# Patient Record
Sex: FEMALE | Race: WHITE | Employment: FULL TIME | URBAN - METROPOLITAN AREA
[De-identification: names, ages, dates, MRNs, and addresses within clinical notes are randomized per-mention and may not be internally consistent; named-entity substitution may affect disease eponyms.]

---

## 2019-03-20 ENCOUNTER — ANESTHESIA EVENT (OUTPATIENT)
Dept: SURGERY | Age: 49
End: 2019-03-20
Payer: COMMERCIAL

## 2019-03-20 NOTE — H&P
PATIENT: Kodak Gallegos YOB: 1970 DATE: 03/15/2019 10:30 AM  
VISIT TYPE: Consult HISTORIAN: self 
_____________________________________________________________ Completed Orders (this encounter) Order Interpretation Result Next Lab Date Dietary management education, guidance, and counseling Assessment/Plan # Detail Type Description 1. Assessment Chronic cholecystitis (K81.1). Impression discussed lap shabana, she needs c-scope and possible EGD, she would like to wait on those and proceed with lap shabana first.  
 Patient Plan lap shabana 2. Assessment Body mass index (BMI) 22.0-22.9, adult (J29.33). Plan Orders Today's instructions / counseling include(s) Dietary management education, guidance, and counseling. This 54year old female presents for Abdominal Discomfort. History of Present Illness: 1. Abdominal Discomfort Onset: gradual.  Severity is 5. Duration is varies. It occurs daily. The problem is worse. Location is RUQ. There is radiation to back and down abdomen. The patient describes it as aching and colicky. Context: after meals. Symptom is aggravated by pressure to abdomen, constipation and food. Denies relieving factors. Additional information: Does not have reptilian pets. Does not have well water. Has recently traveled out of the country. blood in her stool, US neg, HIDA nl EF, reproduction of sx, no jaundice, no f/c, acid blockers did not help. PROBLEM LIST:   Problem List reviewed. Problem Description Onset Date Chronic Clinical Status Notes Muscle, ligament and fascia disorders 2014 N Low back pain 2014 N Disorder of sacrum 2014 N Enthesopathy of hip region 2014 N Hip pain 2014 N    
 
 
 
PAST MEDICAL/SURGICAL HISTORY  (Detailed) Disease/disorder Onset Date Management Date Comments  section Family History  (Detailed) Patient adopted - no family history known. Social History:  (Detailed) Tobacco use reviewed. Preferred language is [place]. EDUCATION/EMPLOYMENT/OCCUPATION Employment History Status Retired Restrictions  
  MARITAL STATUS/FAMILY/SOCIAL SUPPORT Currently . Smoking status: Never smoker. SMOKING STATUS Use Status Type Smoking Status Usage Per Day Years Used Total Pack Years  
no/never  Never smoker TOBACCO/VAPING EXPOSURE No passive smoke exposure. ALCOHOL There is a history of alcohol use. Type: Wine. 1 glass consumed weekly. CAFFEINE The patient uses caffeine: coffee/tea - 3 cups a day. Medications (active prior to today) Medication Instructions Start Date Stop Date Refilled Elsewhere Mircette (28) 0.15 mg-0.02 mg(21)/0.01 mg(5) tablet take 1 tablet by oral route  every day 11/13/2014   N  
AMITRIPTYLINE HCL 25 MG TAB take 1 tablet by mouth at bedtime 06/20/2018 06/20/2018 N Patient Status Completed with information received for patient transitioning into care. Medication Reconciliation Medications reconciled today. Medication Reviewed Adherence Medication Name Sig Desc Elsewhere Status  
taking as directed Mircette (28) 0.15 mg-0.02 mg(21)/0.01 mg(5) tablet take 1 tablet by oral route  every day N Verified  
taking as directed AMITRIPTYLINE HCL 25 MG TAB take 1 tablet by mouth at bedtime N Verified Allergies: 
Ingredient Reaction (Severity) Medication Name Comment ISONIAZID Reviewed, no changes. Review of Systems System Neg/Pos Details Constitutional Negative Fatigue, Fever and Night sweats. ENMT Negative Ear drainage, Hearing loss and Nasal drainage. Eyes Negative Eye discharge, Vision changes and Vision loss. Respiratory Negative Cough, Dyspnea and Wheezing. Cardio Negative Chest pain, Claudication and Irregular heartbeat/palpitations. GI Positive Abdominal pain, Rectal bleeding. GI Negative Abdominal mass, Constipation, Diarrhea, Jaundice, Nausea and Vomiting. GI Comments No c-scope yet, no FH of colon cancer.  Negative Dysuria, Hematuria and Polyuria (Genitourinary). Endocrine Negative Cold intolerance, Heat intolerance, Polydipsia, Polyphagia and Polyuria (Endocrine). Neuro Negative Gait disturbance. Psych Negative Anxiety and Depression. Integumentary Negative Pruritus and Rash. MS Negative Joint swelling and Muscle weakness. Hema/Lymph Negative Easy bleeding and Easy bruising. Allergic/Immuno Negative Environmental allergies and Food allergies. Reproductive Negative Dysmenorrhea, Irregular menses, Menorrhagia and Vaginal discharge. Vital Signs Height Time ft in cm Last Measured Height Position 10:36 AM 5.0 7.50 171.45 03/15/2019 Standing Weight/BSA/BMI Time lb oz kg Context BMI kg/m2 BSA m2  
10:36 .20  65.408 dressed with shoes 22.25 Blood Pressure Time BP mm/Hg Position Side Site Method Cuff Size 10:36 /80 sitting right arm manual adult Temperature/Pulse/Respiration Time Temp F Temp C Temp Site Pulse/min Pattern Resp/ min 10:36 AM    80 regular 12 Pulse Oximetry/FIO2 Time Pulse Ox (Rest %) Pulse Ox (Amb %) O2 Sat O2 L/Min Timing FiO2 % L/min Delivery Method Finger Probe 10:36 AM 98  RA Pain Scale Time Pain Score Method 10:36 AM 8/10 Numeric Pain Intensity Scale Measured By Time Measured by  
10:36 AM Candance Heal Physical Exam: 
Exam Findings Details Constitutional Normal No acute distress. Well nourished. Well developed. Eyes Normal General - Right: Normal, Left: Normal. Sclera - Right: Normal, Left: Normal.  
Neck Exam Normal Inspection - Normal.  
Respiratory Normal Cough - Absent. Effort - Normal.  
Cardiovascular Normal Inspection - JVD: Absent. Heart rate - Regular rate. Rhythm - Regular. Abdomen * Abdominal tenderness - RUQ. Abdomen Normal Patient is not obese. No hepatic enlargement. No spleen enlargement. No hernia. No ascites. No palpable mass. Skin * Inspection - General inspection: warm and dry, anicteric. Extremity Normal No Cyanosis. No Edema. Neurological Normal Level of consciousness - Normal. Orientation - Normal. Memory - Normal.  
Psychiatric Normal Orientation - Oriented to time, place, person & situation. No agitation. Not anxious. Appropriate mood and affect. Medications (added, continued, or stopped this visit): 
Start Date Medication Directions PRN Status PRN Reason Instruction Stop Date  
06/20/2018 AMITRIPTYLINE HCL 25 MG TAB take 1 tablet by mouth at bedtime N     
11/13/2014 Mircette (28) 0.15 mg-0.02 mg(21)/0.01 mg(5) tablet take 1 tablet by oral route  every day N Active Patient Care Team Members Name Contact Agency Type Support Role Relationship [facility] Date [facility]   [facility] provider Sussy Cespedes   Patient provider PCP   [facility] Provider: Chico Hinton 03/18/2019 7:14 AM  
Document generated by:  Young Gomes 03/18/2019 07:13 AM

## 2019-03-21 ENCOUNTER — ANESTHESIA (OUTPATIENT)
Dept: SURGERY | Age: 49
End: 2019-03-21
Payer: COMMERCIAL

## 2019-03-21 ENCOUNTER — APPOINTMENT (OUTPATIENT)
Dept: GENERAL RADIOLOGY | Age: 49
End: 2019-03-21
Attending: SURGERY
Payer: COMMERCIAL

## 2019-03-21 ENCOUNTER — HOSPITAL ENCOUNTER (OUTPATIENT)
Age: 49
Setting detail: OUTPATIENT SURGERY
Discharge: HOME | End: 2019-03-21
Attending: SURGERY | Admitting: SURGERY
Payer: COMMERCIAL

## 2019-03-21 VITALS
SYSTOLIC BLOOD PRESSURE: 137 MMHG | WEIGHT: 147.56 LBS | TEMPERATURE: 97.3 F | HEART RATE: 74 BPM | BODY MASS INDEX: 23.16 KG/M2 | DIASTOLIC BLOOD PRESSURE: 75 MMHG | OXYGEN SATURATION: 100 % | HEIGHT: 67 IN | RESPIRATION RATE: 16 BRPM

## 2019-03-21 DIAGNOSIS — K81.9 CHOLECYSTITIS: Primary | ICD-10-CM

## 2019-03-21 LAB
HCG UR QL: NEGATIVE
HCT VFR BLD AUTO: 35.8 % (ref 35–45)
HGB BLD-MCNC: 12.1 G/DL (ref 12–16)

## 2019-03-21 PROCEDURE — 77030020782 HC GWN BAIR PAWS FLX 3M -B: Performed by: SURGERY

## 2019-03-21 PROCEDURE — 77030008602 HC TRCR ENDOSC EPATH J&J -B: Performed by: SURGERY

## 2019-03-21 PROCEDURE — 81025 URINE PREGNANCY TEST: CPT

## 2019-03-21 PROCEDURE — 77030018836 HC SOL IRR NACL ICUM -A: Performed by: SURGERY

## 2019-03-21 PROCEDURE — 74011000250 HC RX REV CODE- 250: Performed by: SURGERY

## 2019-03-21 PROCEDURE — 76010000138 HC OR TIME 0.5 TO 1 HR: Performed by: SURGERY

## 2019-03-21 PROCEDURE — 77030003580 HC NDL INSUF VERES J&J -B: Performed by: SURGERY

## 2019-03-21 PROCEDURE — 76060000032 HC ANESTHESIA 0.5 TO 1 HR: Performed by: SURGERY

## 2019-03-21 PROCEDURE — 88304 TISSUE EXAM BY PATHOLOGIST: CPT

## 2019-03-21 PROCEDURE — 77030008603 HC TRCR ENDOSC EPATH J&J -C: Performed by: SURGERY

## 2019-03-21 PROCEDURE — 77030008683 HC TU ET CUF COVD -A: Performed by: ANESTHESIOLOGY

## 2019-03-21 PROCEDURE — 74011636320 HC RX REV CODE- 636/320: Performed by: SURGERY

## 2019-03-21 PROCEDURE — 77030008518 HC TBNG INSUF ENDO STRY -B: Performed by: SURGERY

## 2019-03-21 PROCEDURE — 77030018875 HC APPL CLP LIG4 J&J -B: Performed by: SURGERY

## 2019-03-21 PROCEDURE — 36415 COLL VENOUS BLD VENIPUNCTURE: CPT

## 2019-03-21 PROCEDURE — 74011250636 HC RX REV CODE- 250/636

## 2019-03-21 PROCEDURE — 76210000063 HC OR PH I REC FIRST 0.5 HR: Performed by: SURGERY

## 2019-03-21 PROCEDURE — 77030002933 HC SUT MCRYL J&J -A: Performed by: SURGERY

## 2019-03-21 PROCEDURE — 74011250636 HC RX REV CODE- 250/636: Performed by: SURGERY

## 2019-03-21 PROCEDURE — 74300 X-RAY BILE DUCTS/PANCREAS: CPT

## 2019-03-21 PROCEDURE — 76210000021 HC REC RM PH II 0.5 TO 1 HR: Performed by: SURGERY

## 2019-03-21 PROCEDURE — 77030020053 HC ELECTRD LAPSCP COVD -B: Performed by: SURGERY

## 2019-03-21 PROCEDURE — 74011000250 HC RX REV CODE- 250

## 2019-03-21 PROCEDURE — 85018 HEMOGLOBIN: CPT

## 2019-03-21 PROCEDURE — 77030031139 HC SUT VCRL2 J&J -A: Performed by: SURGERY

## 2019-03-21 PROCEDURE — 77030032490 HC SLV COMPR SCD KNE COVD -B: Performed by: SURGERY

## 2019-03-21 PROCEDURE — 77030010030: Performed by: SURGERY

## 2019-03-21 PROCEDURE — 77030020255 HC SOL INJ LR 1000ML BG: Performed by: SURGERY

## 2019-03-21 RX ORDER — DEXTROSE 50 % IN WATER (D50W) INTRAVENOUS SYRINGE
25-50 AS NEEDED
Status: DISCONTINUED | OUTPATIENT
Start: 2019-03-21 | End: 2019-03-21 | Stop reason: HOSPADM

## 2019-03-21 RX ORDER — NALOXONE HYDROCHLORIDE 0.4 MG/ML
0.1 INJECTION, SOLUTION INTRAMUSCULAR; INTRAVENOUS; SUBCUTANEOUS
Status: DISCONTINUED | OUTPATIENT
Start: 2019-03-21 | End: 2019-03-21 | Stop reason: HOSPADM

## 2019-03-21 RX ORDER — BUPIVACAINE HYDROCHLORIDE 5 MG/ML
INJECTION, SOLUTION EPIDURAL; INTRACAUDAL AS NEEDED
Status: DISCONTINUED | OUTPATIENT
Start: 2019-03-21 | End: 2019-03-21 | Stop reason: HOSPADM

## 2019-03-21 RX ORDER — FENTANYL CITRATE 50 UG/ML
25 INJECTION, SOLUTION INTRAMUSCULAR; INTRAVENOUS
Status: ACTIVE | OUTPATIENT
Start: 2019-03-21 | End: 2019-03-21

## 2019-03-21 RX ORDER — NEOSTIGMINE METHYLSULFATE 5 MG/5 ML
SYRINGE (ML) INTRAVENOUS AS NEEDED
Status: DISCONTINUED | OUTPATIENT
Start: 2019-03-21 | End: 2019-03-21 | Stop reason: HOSPADM

## 2019-03-21 RX ORDER — HYDROMORPHONE HYDROCHLORIDE 1 MG/ML
INJECTION, SOLUTION INTRAMUSCULAR; INTRAVENOUS; SUBCUTANEOUS AS NEEDED
Status: DISCONTINUED | OUTPATIENT
Start: 2019-03-21 | End: 2019-03-21 | Stop reason: HOSPADM

## 2019-03-21 RX ORDER — OXYCODONE AND ACETAMINOPHEN 5; 325 MG/1; MG/1
1 TABLET ORAL AS NEEDED
Status: DISCONTINUED | OUTPATIENT
Start: 2019-03-21 | End: 2019-03-21 | Stop reason: HOSPADM

## 2019-03-21 RX ORDER — INSULIN LISPRO 100 [IU]/ML
INJECTION, SOLUTION INTRAVENOUS; SUBCUTANEOUS ONCE
Status: DISCONTINUED | OUTPATIENT
Start: 2019-03-21 | End: 2019-03-21 | Stop reason: HOSPADM

## 2019-03-21 RX ORDER — PROMETHAZINE HYDROCHLORIDE 12.5 MG/1
25 TABLET ORAL
Qty: 15 TAB | Refills: 0 | Status: ON HOLD | OUTPATIENT
Start: 2019-03-21 | End: 2019-06-24

## 2019-03-21 RX ORDER — FENTANYL CITRATE 50 UG/ML
INJECTION, SOLUTION INTRAMUSCULAR; INTRAVENOUS AS NEEDED
Status: DISCONTINUED | OUTPATIENT
Start: 2019-03-21 | End: 2019-03-21 | Stop reason: HOSPADM

## 2019-03-21 RX ORDER — SODIUM CHLORIDE, SODIUM LACTATE, POTASSIUM CHLORIDE, CALCIUM CHLORIDE 600; 310; 30; 20 MG/100ML; MG/100ML; MG/100ML; MG/100ML
125 INJECTION, SOLUTION INTRAVENOUS CONTINUOUS
Status: DISCONTINUED | OUTPATIENT
Start: 2019-03-21 | End: 2019-03-21 | Stop reason: HOSPADM

## 2019-03-21 RX ORDER — MIDAZOLAM HYDROCHLORIDE 1 MG/ML
INJECTION, SOLUTION INTRAMUSCULAR; INTRAVENOUS AS NEEDED
Status: DISCONTINUED | OUTPATIENT
Start: 2019-03-21 | End: 2019-03-21 | Stop reason: HOSPADM

## 2019-03-21 RX ORDER — LIDOCAINE HYDROCHLORIDE 20 MG/ML
INJECTION, SOLUTION EPIDURAL; INFILTRATION; INTRACAUDAL; PERINEURAL AS NEEDED
Status: DISCONTINUED | OUTPATIENT
Start: 2019-03-21 | End: 2019-03-21 | Stop reason: HOSPADM

## 2019-03-21 RX ORDER — MAGNESIUM SULFATE 100 %
4 CRYSTALS MISCELLANEOUS AS NEEDED
Status: DISCONTINUED | OUTPATIENT
Start: 2019-03-21 | End: 2019-03-21 | Stop reason: HOSPADM

## 2019-03-21 RX ORDER — PROPOFOL 10 MG/ML
INJECTION, EMULSION INTRAVENOUS AS NEEDED
Status: DISCONTINUED | OUTPATIENT
Start: 2019-03-21 | End: 2019-03-21 | Stop reason: HOSPADM

## 2019-03-21 RX ORDER — ONDANSETRON 2 MG/ML
4 INJECTION INTRAMUSCULAR; INTRAVENOUS ONCE
Status: DISCONTINUED | OUTPATIENT
Start: 2019-03-21 | End: 2019-03-21 | Stop reason: HOSPADM

## 2019-03-21 RX ORDER — SODIUM CHLORIDE, SODIUM LACTATE, POTASSIUM CHLORIDE, CALCIUM CHLORIDE 600; 310; 30; 20 MG/100ML; MG/100ML; MG/100ML; MG/100ML
50 INJECTION, SOLUTION INTRAVENOUS CONTINUOUS
Status: DISCONTINUED | OUTPATIENT
Start: 2019-03-21 | End: 2019-03-21 | Stop reason: HOSPADM

## 2019-03-21 RX ORDER — ONDANSETRON 2 MG/ML
INJECTION INTRAMUSCULAR; INTRAVENOUS AS NEEDED
Status: DISCONTINUED | OUTPATIENT
Start: 2019-03-21 | End: 2019-03-21 | Stop reason: HOSPADM

## 2019-03-21 RX ORDER — HYDROCODONE BITARTRATE AND ACETAMINOPHEN 5; 300 MG/1; MG/1
1 TABLET ORAL
Qty: 30 TAB | Refills: 0 | Status: SHIPPED | OUTPATIENT
Start: 2019-03-21 | End: 2019-03-24

## 2019-03-21 RX ORDER — HYDROMORPHONE HYDROCHLORIDE 2 MG/ML
0.5 INJECTION, SOLUTION INTRAMUSCULAR; INTRAVENOUS; SUBCUTANEOUS
Status: DISCONTINUED | OUTPATIENT
Start: 2019-03-21 | End: 2019-03-21 | Stop reason: HOSPADM

## 2019-03-21 RX ORDER — GLYCOPYRROLATE 0.2 MG/ML
INJECTION INTRAMUSCULAR; INTRAVENOUS AS NEEDED
Status: DISCONTINUED | OUTPATIENT
Start: 2019-03-21 | End: 2019-03-21 | Stop reason: HOSPADM

## 2019-03-21 RX ORDER — ROCURONIUM BROMIDE 10 MG/ML
INJECTION, SOLUTION INTRAVENOUS AS NEEDED
Status: DISCONTINUED | OUTPATIENT
Start: 2019-03-21 | End: 2019-03-21 | Stop reason: HOSPADM

## 2019-03-21 RX ADMIN — FENTANYL CITRATE 100 MCG: 50 INJECTION, SOLUTION INTRAMUSCULAR; INTRAVENOUS at 11:02

## 2019-03-21 RX ADMIN — HYDROMORPHONE HYDROCHLORIDE 1 MG: 1 INJECTION, SOLUTION INTRAMUSCULAR; INTRAVENOUS; SUBCUTANEOUS at 11:19

## 2019-03-21 RX ADMIN — ONDANSETRON 4 MG: 2 INJECTION INTRAMUSCULAR; INTRAVENOUS at 11:35

## 2019-03-21 RX ADMIN — Medication 3 MG: at 11:35

## 2019-03-21 RX ADMIN — LIDOCAINE HYDROCHLORIDE 60 MG: 20 INJECTION, SOLUTION EPIDURAL; INFILTRATION; INTRACAUDAL; PERINEURAL at 11:02

## 2019-03-21 RX ADMIN — ROCURONIUM BROMIDE 30 MG: 10 INJECTION, SOLUTION INTRAVENOUS at 11:02

## 2019-03-21 RX ADMIN — GLYCOPYRROLATE 0.6 MG: 0.2 INJECTION INTRAMUSCULAR; INTRAVENOUS at 11:35

## 2019-03-21 RX ADMIN — MIDAZOLAM HYDROCHLORIDE 2 MG: 1 INJECTION, SOLUTION INTRAMUSCULAR; INTRAVENOUS at 10:56

## 2019-03-21 RX ADMIN — PROPOFOL 150 MG: 10 INJECTION, EMULSION INTRAVENOUS at 11:02

## 2019-03-21 RX ADMIN — SODIUM CHLORIDE, SODIUM LACTATE, POTASSIUM CHLORIDE, AND CALCIUM CHLORIDE 125 ML/HR: 600; 310; 30; 20 INJECTION, SOLUTION INTRAVENOUS at 10:03

## 2019-03-21 NOTE — INTERVAL H&P NOTE
H&P Update: Taniya Sandoval was seen and examined. History and physical has been reviewed. Significant clinical changes have occurred as noted:  Intraoperative cholangiogram was discussed with patient and  who would like to have it done, this is low risk.  
 
Signed By: Scarlett Dorado MD   
 March 21, 2019 10:39 AM

## 2019-03-21 NOTE — PERIOP NOTES
TRANSFER - OUT REPORT: 
 
Verbal report given to DHEERAJ Pinto(name) on Sanjeev Winters  being transferred to Phase 2(unit) for routine progression of care Report consisted of patients Situation, Background, Assessment and  
Recommendations(SBAR). Information from the following report(s) SBAR, Kardex, OR Summary, Procedure Summary, MAR and Recent Results was reviewed with the receiving nurse. Lines:  
Peripheral IV 03/21/19 Right; Anterior Hand (Active) Site Assessment Clean, dry, & intact 3/21/2019 12:07 PM  
Phlebitis Assessment 0 3/21/2019 12:07 PM  
Infiltration Assessment 0 3/21/2019 12:07 PM  
Dressing Status Clean, dry, & intact 3/21/2019 12:07 PM  
Dressing Type Tape;Transparent 3/21/2019 12:07 PM  
Hub Color/Line Status Pink; Infusing 3/21/2019 12:07 PM  
Alcohol Cap Used No 3/21/2019  9:47 AM  
  
 
Opportunity for questions and clarification was provided. Patient transported with: 
 [facility]

## 2019-03-21 NOTE — PERIOP NOTES
TRANSFER - IN REPORT: 
 
Verbal report received from ORN and Dr. Anika Steward) on Carli Caceres  being received from OR(unit) for routine progression of care Report consisted of patients Situation, Background, Assessment and  
Recommendations(SBAR). Information from the following report(s) SBAR, Kardex, OR Summary, Procedure Summary, MAR and Recent Results was reviewed with the receiving nurse. Opportunity for questions and clarification was provided. Assessment completed upon patients arrival to unit and care assumed.

## 2019-03-21 NOTE — ANESTHESIA POSTPROCEDURE EVALUATION
Post-Anesthesia Evaluation and Assessment Cardiovascular Function/Vital Signs Visit Vitals /72 Pulse 96 Temp 36.9 °C (98.4 °F) Resp 17 Ht 5' 7\" (1.702 m) Wt 66.9 kg (147 lb 9 oz) SpO2 100% BMI 23.11 kg/m² Patient is status post Procedure(s): LAPAROSCOPIC CHOLECYSTECTOMY, INTRAOPERATIVE CHOLANGIOGRAM. 
 
Nausea/Vomiting: Controlled. Postoperative hydration reviewed and adequate. Pain: 
Pain Scale 1: Numeric (0 - 10) (03/21/19 1200) Pain Intensity 1: 0 (03/21/19 1200) Managed. Neurological Status:  
Neuro (WDL): Within Defined Limits (03/21/19 1207) At baseline. Mental Status and Level of Consciousness: Baseline and appropriate for discharge. Pulmonary Status:  
O2 Device: Room air (03/21/19 1200) Adequate oxygenation and airway patent. Complications related to anesthesia: None Post-anesthesia assessment completed. No concerns. Patient has met all discharge requirements. Signed By: Damari Solorio MD  
 March 21, 2019

## 2019-03-21 NOTE — PERIOP NOTES
Reviewed PTA medication list with patient/caregiver and patient/caregiver denies any additional medications. Patient admits to having a responsible adult care for them at home for at least 24 hours after surgery. Patient denies leandro chewing/swallowing difficulties. Patient encouraged to use Samuel paws warming system and informed that using Samuel paws to regulate body temperature prior to a procedure has been shown to help reduce the risks of blood clots and infection. Dual skin assessment & fall risk band verification completed with Vianey Regalado RN.

## 2019-03-21 NOTE — DISCHARGE INSTRUCTIONS
Patient Education   Make sure to follow all of Dr. Daniel Manuel postoperative instructions. Return to the clinic in a week for post-op check. Call Dr. Antwon Martines at [phone] for any post-op questions or concerns. Cholecystectomy: What to Expect at [address] [facility]  After your surgery, it is normal to feel weak and tired for several days after you return home. Your belly may be swollen. If you had laparoscopic surgery, you may also have pain in your shoulder for about 24 hours. You may have gas or need to burp a lot at first, and a few people get diarrhea. The diarrhea usually goes away in 2 to 4 weeks, but it may last longer. How quickly you recover depends on whether you had a laparoscopic or open surgery. · For a laparoscopic surgery, most people can go back to work or their normal routine in 1 to 2 weeks, but it may take longer, depending on the type of work you do. · For an open surgery, it will probably take 4 to 6 weeks before you get back to your normal routine. This care sheet gives you a general idea about how long it will take for you to recover. However, each person recovers at a different pace. Follow the steps below to get better as quickly as possible. How can you care for yourself at home? Activity    · Rest when you feel tired. Getting enough sleep will help you recover.     · Try to walk each day. Start out by walking a little more than you did the day before. Gradually increase the amount you walk. Walking boosts blood flow and helps prevent pneumonia and constipation.     · For about 2 to 4 weeks, avoid lifting anything that would make you strain.  This may include a child, heavy grocery bags and milk containers, a heavy briefcase or backpack, cat litter or dog food bags, or a vacuum .     · Avoid strenuous activities, such as biking, jogging, weightlifting, and aerobic exercise, until your doctor says it is okay.     · You may shower 24 to 48 hours after surgery, if your doctor okays it. Wayna Fair the cut (incision) dry. Do not take a bath for the first 2 weeks, or until your doctor tells you it is okay.     · You may drive when you are no longer taking pain medicine and can quickly move your foot from the gas pedal to the brake. You must also be able to sit comfortably for a long period of time, even if you do not plan to go far. You might get caught in traffic.     · For a laparoscopic surgery, most people can go back to work or their normal routine in 1 to 2 weeks, but it may take longer. For an open surgery, it will probably take 4 to 6 weeks before you get back to your normal routine.     · Your doctor will tell you when you can have sex again.    Diet    · Eat smaller meals more often instead of fewer larger meals. You can eat a normal diet, but avoid eating fatty foods for about 1 month. Fatty foods include hamburger, whole milk, cheese, and many snack foods. If your stomach is upset, try bland, low-fat foods like plain rice, broiled chicken, toast, and yogurt.     · Drink plenty of fluids (unless your doctor tells you not to).   · If you have diarrhea, try avoiding spicy foods, dairy products, fatty foods, and alcohol. You can also watch to see if specific foods cause it, and stop eating them. If the diarrhea continues for more than 2 weeks, talk to your doctor.     · You may notice that your bowel movements are not regular right after your surgery. This is common. Try to avoid constipation and straining with bowel movements. You may want to take a fiber supplement every day. If you have not had a bowel movement after a couple of days, ask your doctor about taking a mild laxative. Medicines    · Your doctor will tell you if and when you can restart your medicines. He or she will also give you instructions about taking any new medicines.     · If you take blood thinners, such as warfarin (Coumadin), clopidogrel (Plavix), or aspirin, be sure to talk to your doctor.  He or she will tell you if and when to start taking those medicines again. Make sure that you understand exactly what your doctor wants you to do.     · Take pain medicines exactly as directed. ? If the doctor gave you a prescription medicine for pain, take it as prescribed. ? If you are not taking a prescription pain medicine, take an over-the-counter medicine such as acetaminophen (Tylenol), ibuprofen (Advil, Motrin), or naproxen (Aleve). Read and follow all instructions on the label. ? Do not take two or more pain medicines at the same time unless the doctor told you to. Many pain medicines contain acetaminophen, which is Tylenol. Too much Tylenol can be harmful.     · If you think your pain medicine is making you sick to your stomach:  ? Take your medicine after meals (unless your doctor tells you not to). ? Ask your doctor for a different pain medicine.     · If your doctor prescribed antibiotics, take them as directed. Do not stop taking them just because you feel better. You need to take the full course of antibiotics. Incision care    · If you have strips of tape on the incision, or cut, leave the tape on for a week or until it falls off.     · After 48 hours, wash the area daily with warm, soapy water, and pat it dry.     · You may have staples to hold the cut together. Keep them dry until your doctor takes them out. This is usually in 7 to 10 days.     · Keep the area clean and dry. You may cover it with a gauze bandage if it weeps or rubs against clothing. Change the bandage every day.    Ice    · To reduce swelling and pain, put ice or a cold pack on your belly for 10 to 20 minutes at a time. Do this every 1 to 2 hours. Put a thin cloth between the ice and your skin. Follow-up care is a key part of your treatment and safety. Be sure to make and go to all appointments, and call your doctor if you are having problems. It's also a good idea to know your test results and keep a list of the medicines you take.   When should you call for help? Call 911 anytime you think you may need emergency care. For example, call if:    · You passed out (lost consciousness).     · You are short of breath. Lucy Jetty your doctor now or seek immediate medical care if:    · You are sick to your stomach and cannot drink fluids.     · You have pain that does not get better when you take your pain medicine.     · You cannot pass stools or gas.     · You have signs of infection, such as:  ? Increased pain, swelling, warmth, or redness. ? Red streaks leading from the incision. ? Pus draining from the incision. ? A fever.     · Bright red blood has soaked through the bandage over your incision.     · You have loose stitches, or your incision comes open.     · You have signs of a blood clot in your leg (called a deep vein thrombosis), such as:  ? Pain in your calf, back of knee, thigh, or groin. ? Redness and swelling in your leg or groin.    Watch closely for any changes in your health, and be sure to contact your doctor if you have any problems. Where can you learn more? Go to http://dylan-alexys.info/. Enter 637 93 122 in the search box to learn more about \"Cholecystectomy: What to Expect at Home. \"  Current as of: March 27, 2018  Content Version: 11.9  © 5704-5164 [employer]. Care instructions adapted under license by [employer] (which disclaims liability or warranty for this information). If you have questions about a medical condition or this instruction, always ask your healthcare professional. [place] [address] any warranty or liability for your use of this information.     DISCHARGE SUMMARY from Nurse    PATIENT INSTRUCTIONS:    After general anesthesia or intravenous sedation, for 24 hours or while taking prescription Narcotics:  · Limit your activities  · Do not drive and operate hazardous machinery  · Do not make important personal or business decisions  · Do  not drink alcoholic beverages  · If you have not urinated within 8 hours after discharge, please contact your surgeon on call. Report the following to your surgeon:  · Excessive pain, swelling, redness or odor of or around the surgical area  · Temperature over 100.5  · Nausea and vomiting lasting longer than 4 hours or if unable to take medications  · Any signs of decreased circulation or nerve impairment to extremity: change in color, persistent  numbness, tingling, coldness or increase pain  · Any questions    What to do at Home:  Recommended activity: Activity as tolerated and no driving for today,     If you experience any of the following symptoms as above, please follow up with Dr. Cee    *  Please give a list of your current medications to your Primary Care Provider. *  Please update this list whenever your medications are discontinued, doses are      changed, or new medications (including over-the-counter products) are added. *  Please carry medication information at all times in case of emergency situations. These are general instructions for a healthy lifestyle:    No smoking/ No tobacco products/ Avoid exposure to second hand smoke  Surgeon General's Warning:  Quitting smoking now greatly reduces serious risk to your health. Obesity, smoking, and sedentary lifestyle greatly increases your risk for illness    A healthy diet, regular physical exercise & weight monitoring are important for maintaining a healthy lifestyle    You may be retaining fluid if you have a history of heart failure or if you experience any of the following symptoms:  Weight gain of 3 pounds or more overnight or 5 pounds in a week, increased swelling in our hands or feet or shortness of breath while lying flat in bed. Please call your doctor as soon as you notice any of these symptoms; do not wait until your next office visit.     Recognize signs and symptoms of STROKE:    F-face looks uneven    A-arms unable to move or move unevenly    S-speech slurred or non-existent    T-time-call 911 as soon as signs and symptoms begin-DO NOT go       Back to bed or wait to see if you get better-TIME IS BRAIN. Warning Signs of HEART ATTACK     Call 911 if you have these symptoms:   Chest discomfort. Most heart attacks involve discomfort in the center of the chest that lasts more than a few minutes, or that goes away and comes back. It can feel like uncomfortable pressure, squeezing, fullness, or pain.  Discomfort in other areas of the upper body. Symptoms can include pain or discomfort in one or both arms, the back, neck, jaw, or stomach.  Shortness of breath with or without chest discomfort.  Other signs may include breaking out in a cold sweat, nausea, or lightheadedness. Don't wait more than five minutes to call 911 - MINUTES MATTER! Fast action can save your life. Calling 911 is almost always the fastest way to get lifesaving treatment. Emergency Medical Services staff can begin treatment when they arrive -- up to an hour sooner than if someone gets to the hospital by car. Patient armband removed and shredded  The discharge information has been reviewed with the patient and caregiver. The patient and caregiver verbalized understanding. Discharge medications reviewed with the patient and caregiver and appropriate educational materials and side effects teaching were provided.   ___________________________________________________________________________________________________________________________________

## 2019-03-21 NOTE — ANESTHESIA PREPROCEDURE EVALUATION
Relevant Problems No relevant active problems Anesthetic History No history of anesthetic complications Review of Systems / Medical History Patient summary reviewed, nursing notes reviewed and pertinent labs reviewed Pulmonary Within defined limits Neuro/Psych Within defined limits Cardiovascular Within defined limits GI/Hepatic/Renal 
  
GERD Endo/Other Within defined limits Other Findings Physical Exam 
 
Airway Mallampati: II 
TM Distance: 4 - 6 cm Neck ROM: normal range of motion Mouth opening: Normal 
 
 Cardiovascular Regular rate and rhythm,  S1 and S2 normal,  no murmur, click, rub, or gallop Dental 
No notable dental hx Pulmonary Breath sounds clear to auscultation Abdominal 
GI exam deferred Other Findings Anesthetic Plan ASA: 1 Anesthesia type: general 
 
 
 
 
Induction: Intravenous Anesthetic plan and risks discussed with: Patient

## 2019-03-22 LAB — HCG UR QL: NEGATIVE

## 2019-03-22 NOTE — OP NOTES
[facility]  OPERATIVE REPORT    Name:  Facundo Pearson  MR#:   [id number]  :  1970  ACCOUNT #:  [[id number]]  DATE OF SERVICE:  2019    PREOPERATIVE DIAGNOSIS:  Chronic cholecystitis. POSTOPERATIVE DIAGNOSIS:  Chronic cholecystitis. PROCEDURE PERFORMED:  Laparoscopic cholecystectomy and intraoperative cholangiogram.    SURGEON:  Papi Cerna MD    FIRST ASSISTANT:  Wendie Swanson. ANESTHESIA:  General and local.    COMPLICATIONS:  No complications. SPECIMENS REMOVED:  Gallbladder. DRAINS:  No drains. IMPLANTS:  No implants. ESTIMATED BLOOD LOSS:  5 mL. INDICATIONS FOR PROCEDURE:  This is a 19-year-old female with persistent right upper quadrant abdominal pain associated with an abnormal HIDA scan, and she is brought to the operating room for cholecystectomy. DESCRIPTION OF THE PROCEDURE:  The patient was brought in to the operating room, placed on the table in the supine position. After placing monitors and adequate general anesthesia, the abdomen was prepped and draped in the usual sterile fashion. SCD hose were placed preoperatively. A small vertical incision was made at the base of the umbilicus through the skin down to subcutaneous tissue. A Veress needle was placed in the abdomen and it was insufflated with carbon dioxide to 15 mmHg pressure. A 5-mm port was placed at this location. The scope was placed into the peritoneal cavity. Two 5-mm ports were placed in the right subcostal area, and a 10-mm port was placed in the subxiphoid area. The liver was normal.  The visualized bowel was normal.  The gallbladder was normally distended with minimal inflammation. The fundus of the gallbladder was grasped and retracted over the liver in a cephalad direction. The peritoneum overlying the body and infundibulum was dissected free. A cystic artery was dissected out. It was clipped and divided.   The body of the gallbladder was removed from the liver with electrocautery, and dissection was carried down towards the cystic duct. The cystic duct looked chronically inflamed. The common bile duct was clearly identified and it was uninflamed and normal size. A clip was placed on the cystic duct, and a partial incision was made on the cystic duct. A cholangiogram catheter was placed through the abdominal wall into the cystic duct and secured with a clip. Using contrast and fluoroscopy, an intraoperative cholangiogram was performed. Dye went readily from the cystic duct to the common bile duct and into the duodenum. The common hepatic duct and both hepatic radicals were identified. There were no masses, strictures, or stones. There were a few air bubbles in the distal common bile duct. The catheter was removed, and the cystic duct was clipped twice and divided. The rest of the gallbladder was removed from the liver with the electrocautery. The gallbladder was brought up the subxiphoid port. The right upper quadrant was irrigated with saline. There was no active bleeding or evidence of a bile leak. The ports were removed under direct visualization. There was no active bleeding. Fascial defect in the subxiphoid area was closed with interrupted #0 Vicryl suture. Marcaine was injected locally, and 4-0 Monocryl was used to reapproximate the skin. Steri-Strips were applied and the wound was dressed sterilely. The sponge, instrument, and needle count was correct at the end of the procedure.       Jamila Moore MD      [id number]  D:  03/21/2019 11:36  T:  03/21/2019 11:39  JOB #:  [id number] no

## 2019-06-22 RX ORDER — SODIUM CHLORIDE 0.9 % (FLUSH) 0.9 %
5-40 SYRINGE (ML) INJECTION EVERY 8 HOURS
Status: CANCELLED | OUTPATIENT
Start: 2019-06-22

## 2019-06-22 RX ORDER — SODIUM CHLORIDE 0.9 % (FLUSH) 0.9 %
5-40 SYRINGE (ML) INJECTION AS NEEDED
Status: CANCELLED | OUTPATIENT
Start: 2019-06-22

## 2019-06-24 ENCOUNTER — HOSPITAL ENCOUNTER (OUTPATIENT)
Age: 49
Setting detail: OUTPATIENT SURGERY
Discharge: HOME | End: 2019-06-24
Attending: INTERNAL MEDICINE | Admitting: INTERNAL MEDICINE
Payer: COMMERCIAL

## 2019-06-24 VITALS
SYSTOLIC BLOOD PRESSURE: 122 MMHG | DIASTOLIC BLOOD PRESSURE: 66 MMHG | TEMPERATURE: 97 F | HEART RATE: 74 BPM | HEIGHT: 68 IN | RESPIRATION RATE: 16 BRPM | WEIGHT: 141.6 LBS | BODY MASS INDEX: 21.46 KG/M2 | OXYGEN SATURATION: 100 %

## 2019-06-24 PROCEDURE — 77030020256 HC SOL INJ NACL 0.9%  500ML: Performed by: INTERNAL MEDICINE

## 2019-06-24 PROCEDURE — 76040000007: Performed by: INTERNAL MEDICINE

## 2019-06-24 PROCEDURE — 99153 MOD SED SAME PHYS/QHP EA: CPT | Performed by: INTERNAL MEDICINE

## 2019-06-24 PROCEDURE — 74011250636 HC RX REV CODE- 250/636: Performed by: INTERNAL MEDICINE

## 2019-06-24 PROCEDURE — 77030032490 HC SLV COMPR SCD KNE COVD -B: Performed by: INTERNAL MEDICINE

## 2019-06-24 PROCEDURE — G0500 MOD SEDAT ENDO SERVICE >5YRS: HCPCS | Performed by: INTERNAL MEDICINE

## 2019-06-24 PROCEDURE — 74011250636 HC RX REV CODE- 250/636

## 2019-06-24 RX ORDER — SODIUM CHLORIDE 9 MG/ML
1000 INJECTION, SOLUTION INTRAVENOUS CONTINUOUS
Status: DISCONTINUED | OUTPATIENT
Start: 2019-06-24 | End: 2019-06-24 | Stop reason: HOSPADM

## 2019-06-24 RX ORDER — FENTANYL CITRATE 50 UG/ML
100 INJECTION, SOLUTION INTRAMUSCULAR; INTRAVENOUS
Status: DISCONTINUED | OUTPATIENT
Start: 2019-06-24 | End: 2019-06-24 | Stop reason: HOSPADM

## 2019-06-24 RX ORDER — ATROPINE SULFATE 0.1 MG/ML
0.5 INJECTION INTRAVENOUS
Status: DISCONTINUED | OUTPATIENT
Start: 2019-06-24 | End: 2019-06-24 | Stop reason: HOSPADM

## 2019-06-24 RX ORDER — EPINEPHRINE 0.1 MG/ML
1 INJECTION INTRACARDIAC; INTRAVENOUS
Status: DISCONTINUED | OUTPATIENT
Start: 2019-06-24 | End: 2019-06-24 | Stop reason: HOSPADM

## 2019-06-24 RX ORDER — MIDAZOLAM HYDROCHLORIDE 1 MG/ML
.25-5 INJECTION, SOLUTION INTRAMUSCULAR; INTRAVENOUS
Status: DISCONTINUED | OUTPATIENT
Start: 2019-06-24 | End: 2019-06-24 | Stop reason: HOSPADM

## 2019-06-24 RX ORDER — DEXTROMETHORPHAN/PSEUDOEPHED 2.5-7.5/.8
1.2 DROPS ORAL
Status: DISCONTINUED | OUTPATIENT
Start: 2019-06-24 | End: 2019-06-24 | Stop reason: HOSPADM

## 2019-06-24 RX ORDER — DIPHENHYDRAMINE HYDROCHLORIDE 50 MG/ML
50 INJECTION, SOLUTION INTRAMUSCULAR; INTRAVENOUS ONCE
Status: COMPLETED | OUTPATIENT
Start: 2019-06-24 | End: 2019-06-24

## 2019-06-24 RX ORDER — NALOXONE HYDROCHLORIDE 0.4 MG/ML
0.4 INJECTION, SOLUTION INTRAMUSCULAR; INTRAVENOUS; SUBCUTANEOUS
Status: DISCONTINUED | OUTPATIENT
Start: 2019-06-24 | End: 2019-06-24 | Stop reason: HOSPADM

## 2019-06-24 RX ORDER — FLUMAZENIL 0.1 MG/ML
0.2 INJECTION INTRAVENOUS
Status: DISCONTINUED | OUTPATIENT
Start: 2019-06-24 | End: 2019-06-24 | Stop reason: HOSPADM

## 2019-06-24 NOTE — PROCEDURES
[facility]  Colonoscopy Procedure Report  _______________________________________________________  Patient: Sanjuanita Jordan                                         Attending Physician: Harley Winchester MD    Patient ID: [id number]                                      Referring Physician: Jamal Carroll MD    Exam Date: June 24, 2019 _______________________________________________________      Introduction: A  54 y.o. female patient, presents for outpatient Colonoscopy    Indications: 2 episodes of rectal bleeding on the stools and later confirmed with positive occult blood i the stool. Since the cholecystectomy on March 21, 2019 she has been having two to 3 soft stools per day. for the past 2 months she has been having mid abdominal cramps after heavy meals followed by diarrhea which apparently started even before the cholecystectomy. No family history of colon cancer. She had 2 previous c sections. Consent: The benefits, risks, and alternatives to the procedure were discussed and informed consent was obtained from the patient. Preparation: EKG, pulse, pulse oximetry and blood pressure were monitored throughout the procedure. ASA Classification: Class 1 - . The heart is an S1-S2 and regular heart rate and rhythm. Lungs are clear to auscultation and percussion. Abdomen is soft, nondistended, and nontender. Mental Status: awake, alert, and oriented to person, place, and time    Medications:  · Previously sedated for the EGD  · Versed 7 mg IV throughout the procedure. Rectal Exam: Normal Rectal Exam. No Blood. Pathology Specimens: No specimens removed. Procedure: The colonoscope was passed with some difficulty through the anus under direct visualization and advanced to the cecum and 5 cm inside the terminal ileum. The patient required positioning on the back to aid in the passage of the scope. The scope was withdrawn and the mucosa was carefully examined.  The quality of the preparation was very good. The views were excellent. The patient's toleration of the procedure was excellent. Retroflexion was preformed in the ascending colon and hepatic flexure. The exam was done twice to the cecum. Total 21 time is minutes and withdrawal time is 12 minutes. Findings:    Rectum:   Tiny internal hemorrhoids but no blood. Sigmoid:   Tortuous and fixed sigmoid colon from adhesions. Moderate sigmoid diverticulosis. Descending Colon:   Normal  Transverse Colon: Moderate diverticulosis of the transverse colon more severe toward the hepatic flexure. Ascending Colon:   Normal  Cecum:   The cecum crosses the mid line toward the left side and the hepatic flexure is located in the RLQ. Terminal Ileum:   Normal      Unplanned Events: There were no unplanned events. Estimated Blood Loss: None  Impressions:    Tiny internal hemorrhoids but no blood. Tortuous and fixed sigmoid colon from adhesions. Moderate sigmoid diverticulosis. Moderate diverticulosis of the transverse colon more severe toward the hepatic flexure. The cecum crosses the mid line toward the left side and the hepatic flexure is located in the RLQ. Normal Mucosa. No adenoma polyp or tumor are found. Complications: None; patient tolerated the procedure well. Recommendations:  · Discharge home when standard parameters are met. · Resume a high fiber diet. · Colonoscopy recommendation in 10 years.     Procedure Codes:    · COLONOSCOPY [[id number] (Type: Custom)]  Endoscope Information:  Model Number(s)    [id number]     Assistant: None  Signed By: Tom Barkley MD Date: June 24, 2019

## 2019-06-24 NOTE — PROCEDURES
[facility]  Esophagogastroduodenoscopy Procedure Report  _______________________________________________________  Patient: Frances Ko                                         Attending Physician: Bárbara Hu MD    Patient ID: [id number]                                      Referring Physician: Mich Ann MD    Exam Date: June 24, 2019 _______________________________________________________      Introduction: A  54 y.o. female patient, presents for Esophagogastroduodenoscopy Procedure. Indication:   frequent belching rare heartburns that was more frequent before the gallbladder surgery in March 21, 2019. She took Dexilant and Ranitidine for one month it helped her. Presently she has no longer dyspepsia. She has been taking Excedrin for intermittent severe Migraine. Last one was a week ago. EGD 25 years ago that was negative. H. pylori urea breath test came back negative. : Bárbara Hu MD    Sedation:    Benadryl 50 mg, Versed 3 mg IV, fentanyl 100 mcg IV  Procedure Details:  After infomed consent was obtained for the procedure, with all risks and benefits of procedure explained the patient was taken to the endoscopy suite and placed in the left lateral decubitus position. Following sequential administration of sedation as per above, the endoscope was inserted into the mouth and advanced under direct vision to third portion of the duodenum. A careful inspection was made as the gastroscope was withdrawn, including a retroflexed view of the proximal stomach; findings and interventions are described below. Findings:   HYPOPHARYNX AND LARYNX: Normal  Esophagus: Normal proximal, middle and distal esophagus. Z line is regular and normal. There is a tiny and insignificant reducible Hiatal hernia. Diaphragmatic pinch located at 45 cm. Stomach: No food or liquid retention. Normal, cardia, fundus, body, lesser curvature, incisura, antrum and pylorus.  Mucosa is normal.  Duodenum:   The bulb, second, third, fourth portions and major papilla are normal   Therapies:    none    Specimen:   none           Complications:   None    EBL:  None  IMPRESSION: Moderate amount of bile in the stomach but no gastritis. Very few tiny hemorrhagic erosions in the antrum probably related to the recent intake of Excedrin? Duodenum and the duodenal mucosa appear normal.          Recommendations: -GERD diet: avoid fried and fatty foods. peppermint, chocolate, alcohol, coffee, citrus fruits and juices, tomoato products; avoid lying down for 2 to 3 hours after eating. , -It is better to avoid NSAID'S, if neccessary then it could be taken with Omeprazole 40 mg to protect the stomach , -treat heartburns with Gaviscon and or Ranitidine (Zantac) 150 mg as needed.    Assistant: none    Catherine Crump MD  6/24/2019  2:16 PM

## 2019-06-24 NOTE — H&P
This 54year old female presents for Blood in stool, Abdominal Pain and Dyspepsia. History of Present Illness:  1. Blood in stool   Quality:  FOBT positive and BRBPR w/ bowel movement. Associated symptoms include abdominal pain. Pertinent negatives include change in bowel habits, constipation, decreased appetite, diarrhea, heartburn, nausea, vomiting and weight loss. Additional information: twice in February and March streaks on the stool that was confirmed by occult blood on the stools. usually she she has 2 to 3  soft bm / d. fx hx unknown. Sometimes sporadic mid abdominal pain with la. 2.  Abdominal Pain   Onset: 2 months ago. Severity is mild. Duration is 2 Hours. It occurs weekly. Location is shai-umbilical.  Symptom is aggravated by heavy meals and fatty foods. Relieving factors include bowel movement and loose bm. Additional information: in 2018 was having epigastric and substernal chest pain that turned out to be due to acalculous cholecystitis as not improved with antacids. cholecystectomy. No more attacks. wt fluctuating. 2 c section in  and   3. Dyspepsia   Pertinent negatives include, dysphagia, heartburn, vomiting and weight loss. Additional information: frequent belching with nausea but no dysphagia or heartburns since the surgery. used to have a lot of heartburns before. took Dexilant and Zantac but stopped since the surgery no problem since. PROBLEM LIST:   Problem List reviewed.    Problem Description Onset Date Chronic Clinical Status Notes   Muscle, ligament and fascia disorders 2014 N     Low back pain 2014 N     Disorder of sacrum 2014 N     Enthesopathy of hip region 2014 N     Hip pain 2014 N               PAST MEDICAL/SURGICAL HISTORY   (Detailed)    Disease/disorder Onset Date Management Date Comments      section       GYNECOLOGIC HISTORY:  Patient is postmenopausal.       Family History (Detailed)  Patient adopted - no family history known. Social History:  (Detailed)  Tobacco use reviewed. Preferred language is [place]. EDUCATION/EMPLOYMENT/OCCUPATION  Employment History Status Retired Restrictions            MARITAL STATUS/FAMILY/SOCIAL SUPPORT  Currently . Smoking status: Never smoker. SMOKING STATUS  Use Status Type Smoking Status Usage Per Day Years Used Total Pack Years   no/never  Never smoker        TOBACCO/VAPING EXPOSURE  No passive smoke exposure. ALCOHOL  There is a history of alcohol use. Type: Wine. 1 glass consumed weekly. CAFFEINE  The patient uses caffeine: coffee/tea - 3 cups a day. Medications (active prior to today)  Medication Instructions Start Date Stop Date Refilled Elsewhere   Mircette (28) 0.15 mg-0.02 mg(21)/0.01 mg(5) tablet take 1 tablet by oral route  every day 11/13/2014   N   AMITRIPTYLINE HCL 25 MG TAB take 1 tablet by mouth at bedtime 06/20/2018 06/20/2018 N     Patient Status   Completed with information received for patient in a summary of care record. Medication Reconciliation  Medications reconciled today.   Medication Reviewed  Adherence Medication Name Sig Desc Elsewhere Status   taking as directed AMITRIPTYLINE HCL 25 MG TAB take 1 tablet by mouth at bedtime N Verified   taking as directed GaviLyte-N 420 gram oral solution take as prescribed by physician N Verified   taking as directed Mircette (28) 0.15 mg-0.02 mg(21)/0.01 mg(5) tablet take 1 tablet by oral route  every day N Verified     Medications (Added, Continued or Stopped today)  Start Date Medication Directions PRN Status PRN Reason Instruction Stop Date   06/20/2018 AMITRIPTYLINE HCL 25 MG TAB take 1 tablet by mouth at bedtime N      04/25/2019 GaviLyte-N 420 gram oral solution take as prescribed by physician N      11/13/2014 Mircette (28) 0.15 mg-0.02 mg(21)/0.01 mg(5) tablet take 1 tablet by oral route  every day N Allergies:  Ingredient Reaction (Severity) Medication Name Comment   ISONIAZID      Reviewed, no changes. ORDERS:  Status Lab Order Time Frame Comments   ordered H. pylori Breath Test     Review of Systems  System Neg/Pos Details   Constitutional Negative Chills, Fever, Malaise and Weight loss. ENMT Negative Nasal congestion and Sore throat. Eyes Negative Double vision. Respiratory Negative Asthma, Chronic cough and Wheezing. Cardio Negative Chest pain, Edema and Irregular heartbeat/palpitations. GI Positive Abdominal pain, Melena. GI Negative Change in bowel habits, Constipation, Decreased appetite, Diarrhea, Dysphagia, Heartburn, Hematemesis, Hematochezia, Nausea, Reflux and Vomiting.  Negative Dysuria and Hematuria. Endocrine Negative Cold intolerance, Heat intolerance and Increased thirst.   Neuro Negative Dizziness, Headache, Numbness, Tremors and Vertigo. Psych Negative Anxiety, Depression and Increased stress. Integumentary Negative Hives, Pruritus and Rash. MS Negative Back pain, Joint pain and Myalgia. Hema/Lymph Negative Easy bleeding and Easy bruising. Allergic/Immuno Negative Contact allergy, Food allergies and Seasonal allergies. Reproductive Positive The patient is post-menopausal.   Vital Signs     Gynecologic History  Patient is postmenopausal.      Height  Time ft in cm Last Measured Height Position   4:12 PM 5.0 7.50 171.45 04/25/2019 Standing     Date/Time Temp Pulse BP MAP (Calculated) Arterial Line 1 BP (mmHg) BP Patient Position Resp SpO2 O2 Device O2 Flow Rate (L/min) Pre/Post Ductal Weight   06/24/19 1254 97.1 °F (36.2 °C) 71 124/72 89   16 100 % Room air   64.2 kg (141 lb 9.6 oz)         PHYSICAL EXAM:  Exam Findings Details   Constitutional Normal No acute distress. Well Nourished. Well developed.    Eyes Normal General - Right: Normal, Left: Normal. Conjunctiva - Right: Normal, Left: Normal. Sclera - Right: Normal, Left: Normal. Pupil - Right: Normal, Left: Normal.   Nose/Mouth/Throat Normal Lips/teeth/gums - Normal. Tongue - Normal. Buccal mucosa - Normal. Palate & uvula - Normal.   Neck Exam Normal Inspection - Normal. Palpation - Normal. Thyroid gland - Normal. Submandibular lymph nodes - Normal.   Lymph Detail Normal Submandibular. Anterior cervical. Posterior cervical. Supraclavicular. Respiratory Normal Inspection - Normal. Auscultation - Normal. Percussion - Normal. Cough - Absent. Effort - Normal.   Cardiovascular Normal Heart rate - Regular rate. Heart sounds - Normal S1, Normal S2. Murmurs - None. Extremities - No edema. Abdomen Normal Inspection - Normal. Appliance(s) - None. Abdominal muscles - Normal. Auscultation - Normal. Percussion - Normal. Anterior palpation - Normal, No guarding, No rebound. CVA tenderness - None. Umbilicus - Normal. No abdominal tenderness. No hepatic enlargement. No splenic enlargement. No hernia. No Ascites. No palpable mass. De Anda's sign - Negative. Skin Normal Inspection - Normal.   Musculoskeletal Normal Hands - Left: Normal, Right: Normal.   Extremity Normal No cyanosis. No edema. Clubbing - Absent. Neurological Normal Level of consciousness - Normal. Orientation - Normal. Memory - Normal. Motor - Normal. Balance & gait - Normal. Coordination - Normal. Fine motor skills - Normal. DTRs - Normal.   Psychiatric Normal Orientation - Oriented to time, place, person & situation. Not anxious. Appropriate mood and affect. Behavior appropriate for age. Sufficient fund of knowledge. Sufficient language. No memory loss. Assessment/Plan  # Detail Type Description    1. Assessment Rectal bleed (K62.5). Patient Plan 2 episodes of rectal bleeding on the stools and later confirmed with positive occult blood i the stool.  she has two to 3 soft stools per day  need to do colonoscopy    I explained to the patient the procedure of colonoscopy and the risk involved which includes but not limited to bleeding, perforation, infection or missing a lesion if the bowels are not well clean or are unusually tortuous and difficult. I gave her the  gavilyte . I  answered her questions. She was agreeable to proceed with this         2. Assessment Occult blood in feces (R19.5). 3. Assessment Dyspepsia (R10.13). Patient Plan frequent belching rare heartburns that was more frequent before the gallbladder surgery. She took Dexilant and Ranitidine for one month it helped her  I think it is reasonable to do an EGD to check the stomach    Plan Orders H. pylori Breath Test to be performed. 4. Assessment Lower abdominal pain, unspecified (R10.30). Patient Plan for the past 2 months she has been having mid abdominal cramps after heavy meals followed by diarrhea which apparently started even before the cholecystectomy         5. Assessment Migraine (G43.909). Patient Plan she has frequent migraine headaches for which she has to take Excedrin. this could explain the heartburn and belching. we need to check the urea breath test to r/o H Pylori. she told me that she had an EGD 25 years ago that was negative.   I wouldn't be surprised that she has PUD and possibly GERD     No change in H&P

## 2019-06-24 NOTE — DISCHARGE INSTRUCTIONS
Vicky Link   DISCHARGE SUMMARY from Nurse    PATIENT INSTRUCTIONS:    After general anesthesia or intravenous sedation, for 24 hours or while taking prescription Narcotics:  · Limit your activities  · Do not drive and operate hazardous machinery  · Do not make important personal or business decisions  · Do  not drink alcoholic beverages  · If you have not urinated within 8 hours after discharge, please contact your surgeon on call. Report the following to your surgeon:  · Excessive pain, swelling, redness or odor of or around the surgical area  · Temperature over 100.5  · Nausea and vomiting lasting longer than 4 hours or if unable to take medications  · Any signs of decreased circulation or nerve impairment to extremity: change in color, persistent  numbness, tingling, coldness or increase pain  · Any questions    What to do at Home:  Recommended activity: as above     If you experience any of the following symptoms as above, please follow up with Doctor Floresita Wiggins. *  Please give a list of your current medications to your Primary Care Provider. *  Please update this list whenever your medications are discontinued, doses are      changed, or new medications (including over-the-counter products) are added. *  Please carry medication information at all times in case of emergency situations. These are general instructions for a healthy lifestyle:    No smoking/ No tobacco products/ Avoid exposure to second hand smoke  Surgeon General's Warning:  Quitting smoking now greatly reduces serious risk to your health.     Obesity, smoking, and sedentary lifestyle greatly increases your risk for illness    A healthy diet, regular physical exercise & weight monitoring are important for maintaining a healthy lifestyle    You may be retaining fluid if you have a history of heart failure or if you experience any of the following symptoms:  Weight gain of 3 pounds or more overnight or 5 pounds in a week, increased swelling in our hands or feet or shortness of breath while lying flat in bed. Please call your doctor as soon as you notice any of these symptoms; do not wait until your next office visit. The discharge information has been reviewed with the patient and spouse. The patient and spouse verbalized understanding. Discharge medications reviewed with the patient and spouse and appropriate educational materials and side effects teaching were provided. ___________________________________________________________________________________________________________________________________  [id number]  1970    COLON / EGD DISCHARGE INSTRUCTIONS    Discomfort:  Sore throat- throat lozenges or warm salt water gargle  Redness at IV site- apply warm compress to area; if redness or soreness persist- contact your physician  There may be a slight amount of blood passed from the rectum  Gaseous discomfort- walking, belching will help relieve any discomfort  You may not operate a vehicle until next day  You may not engage in an occupation involving machinery or appliances until next day  You may not drink alcoholic beverages until next day  Avoid making any critical decisions for at least 24 hour    DIET:   High fiber and anti reflux diet. ACTIVITY:  You may not  resume your normal daily activities until the next day, it is recommended that you spend the remainder of the day resting -  avoid any strenuous activity. CALL M.D.  IF ANY SIGN OF:   Increasing pain, nausea, vomiting  Abdominal distension (swelling)  New increased bleeding (oral or rectal)  Fever (chills)  Pain in chest area  Bloody discharge from nose or mouth  Shortness of breath     You may take any Advil, Aspirin, Ibuprofen, Motrin, Aleve, or Goodys but preferably Tylenol as needed for pain. Follow-up Instructions: Follow-up in the office as scheduled or make a follow-up appointment in 2 weeks.     Diandra Ba MD  June 24, 2019    Patient armband removed and shredded  . Patient Education        High-Fiber Diet: Care Instructions  Your Care Instructions    A high-fiber diet may help you relieve constipation and feel less bloated. Your doctor and dietitian will help you make a high-fiber eating plan based on your personal needs. The plan will include the things you like to eat. It will also make sure that you get 30 grams of fiber a day. Before you make changes to the way you eat, be sure to talk with your doctor or dietitian. Follow-up care is a key part of your treatment and safety. Be sure to make and go to all appointments, and call your doctor if you are having problems. It's also a good idea to know your test results and keep a list of the medicines you take. How can you care for yourself at home? · You can increase how much fiber you get if you eat more of certain foods. These foods include:  ? Whole-grain breads and cereals. ? Fruits, such as pears, apples, and peaches. Eat the skins, peels, and seeds, if you can.  ? Vegetables, such as broccoli, cabbage, spinach, carrots, asparagus, and squash. ? Starchy vegetables. These include potatoes with skins, kidney beans, and lima beans. · Take a fiber supplement every day if your doctor recommends it. Examples are Benefiber, Citrucel, FiberCon, and Metamucil. Ask your doctor how much to take. · Drink plenty of fluids, enough so that your urine is light yellow or clear like water. If you have kidney, heart, or liver disease and have to limit fluids, talk with your doctor before you increase the amount of fluids you drink. · Get some exercise every day. Exercise helps stool move through the colon. It also helps prevent constipation. · Keep a food diary. Try to notice and write down what foods cause gas, pain, or other symptoms. Then you can avoid these foods. Where can you learn more? Go to http://dylan-alexys.info/.   Enter P613 in the search box to learn more about \"High-Fiber Diet: Care Instructions. \"  Current as of: March 28, 2018  Content Version: 11.9  © 8294-8241 [employer]. Care instructions adapted under license by [employer] (which disclaims liability or warranty for this information). If you have questions about a medical condition or this instruction, always ask your healthcare professional. [address] any warranty or liability for your use of this information.

## 2023-01-28 ENCOUNTER — HOSPITAL ENCOUNTER (EMERGENCY)
Age: 53
Discharge: HOME | End: 2023-01-28
Payer: COMMERCIAL

## 2023-01-28 VITALS
BODY MASS INDEX: 23.23 KG/M2 | HEART RATE: 104 BPM | TEMPERATURE: 98.1 F | RESPIRATION RATE: 16 BRPM | SYSTOLIC BLOOD PRESSURE: 158 MMHG | WEIGHT: 148 LBS | DIASTOLIC BLOOD PRESSURE: 104 MMHG | OXYGEN SATURATION: 100 % | HEIGHT: 67 IN

## 2023-01-28 DIAGNOSIS — G43.809 OTHER MIGRAINE WITHOUT STATUS MIGRAINOSUS, NOT INTRACTABLE: Primary | ICD-10-CM

## 2023-01-28 LAB
ALBUMIN SERPL-MCNC: 4 G/DL (ref 3.4–5)
ALBUMIN/GLOB SERPL: 1 (ref 0.8–1.7)
ALP SERPL-CCNC: 71 U/L (ref 45–117)
ALT SERPL-CCNC: 22 U/L (ref 13–56)
ANION GAP SERPL CALC-SCNC: 8 MMOL/L (ref 3–18)
APPEARANCE UR: CLEAR
AST SERPL-CCNC: 16 U/L (ref 10–38)
BACTERIA URNS QL MICRO: ABNORMAL /HPF
BASOPHILS # BLD: 0 K/UL (ref 0–0.1)
BASOPHILS NFR BLD: 1 % (ref 0–2)
BILIRUB SERPL-MCNC: 0.3 MG/DL (ref 0.2–1)
BILIRUB UR QL: NEGATIVE
BUN SERPL-MCNC: 8 MG/DL (ref 7–18)
BUN/CREAT SERPL: 10 (ref 12–20)
CALCIUM SERPL-MCNC: 9.3 MG/DL (ref 8.5–10.1)
CHLORIDE SERPL-SCNC: 105 MMOL/L (ref 100–111)
CO2 SERPL-SCNC: 26 MMOL/L (ref 21–32)
COLOR UR: YELLOW
CREAT SERPL-MCNC: 0.84 MG/DL (ref 0.6–1.3)
DIFFERENTIAL METHOD BLD: ABNORMAL
EOSINOPHIL # BLD: 0.3 K/UL (ref 0–0.4)
EOSINOPHIL NFR BLD: 3 % (ref 0–5)
EPITH CASTS URNS QL MICRO: ABNORMAL /LPF (ref 0–5)
ERYTHROCYTE [DISTWIDTH] IN BLOOD BY AUTOMATED COUNT: 12.8 % (ref 11.6–14.5)
GLOBULIN SER CALC-MCNC: 4 G/DL (ref 2–4)
GLUCOSE SERPL-MCNC: 93 MG/DL (ref 74–99)
GLUCOSE UR STRIP.AUTO-MCNC: NEGATIVE MG/DL
HCG UR QL: NEGATIVE
HCT VFR BLD AUTO: 39.6 % (ref 35–45)
HGB BLD-MCNC: 13.2 G/DL (ref 12–16)
HGB UR QL STRIP: NEGATIVE
IMM GRANULOCYTES # BLD AUTO: 0 K/UL (ref 0–0.04)
IMM GRANULOCYTES NFR BLD AUTO: 0 % (ref 0–0.5)
KETONES UR QL STRIP.AUTO: NEGATIVE MG/DL
LEUKOCYTE ESTERASE UR QL STRIP.AUTO: ABNORMAL
LYMPHOCYTES # BLD: 3.9 K/UL (ref 0.9–3.6)
LYMPHOCYTES NFR BLD: 46 % (ref 21–52)
MCH RBC QN AUTO: 29.3 PG (ref 24–34)
MCHC RBC AUTO-ENTMCNC: 33.3 G/DL (ref 31–37)
MCV RBC AUTO: 88 FL (ref 78–100)
MONOCYTES # BLD: 0.5 K/UL (ref 0.05–1.2)
MONOCYTES NFR BLD: 6 % (ref 3–10)
NEUTS SEG # BLD: 3.8 K/UL (ref 1.8–8)
NEUTS SEG NFR BLD: 44 % (ref 40–73)
NITRITE UR QL STRIP.AUTO: NEGATIVE
NRBC # BLD: 0 K/UL (ref 0–0.01)
NRBC BLD-RTO: 0 PER 100 WBC
PH UR STRIP: 6.5 (ref 5–8)
PLATELET # BLD AUTO: 407 K/UL (ref 135–420)
PMV BLD AUTO: 9.9 FL (ref 9.2–11.8)
POTASSIUM SERPL-SCNC: 4.5 MMOL/L (ref 3.5–5.5)
PROT SERPL-MCNC: 8 G/DL (ref 6.4–8.2)
PROT UR STRIP-MCNC: NEGATIVE MG/DL
RBC # BLD AUTO: 4.5 M/UL (ref 4.2–5.3)
RBC #/AREA URNS HPF: ABNORMAL /HPF (ref 0–5)
SODIUM SERPL-SCNC: 139 MMOL/L (ref 136–145)
SP GR UR REFRACTOMETRY: <1.005 (ref 1–1.03)
UROBILINOGEN UR QL STRIP.AUTO: 0.2 EU/DL (ref 0.2–1)
WBC # BLD AUTO: 8.5 K/UL (ref 4.6–13.2)
WBC URNS QL MICRO: ABNORMAL /HPF (ref 0–5)

## 2023-01-28 PROCEDURE — 80053 COMPREHEN METABOLIC PANEL: CPT

## 2023-01-28 PROCEDURE — 85025 COMPLETE CBC W/AUTO DIFF WBC: CPT

## 2023-01-28 PROCEDURE — 99284 EMERGENCY DEPT VISIT MOD MDM: CPT

## 2023-01-28 PROCEDURE — 74011250636 HC RX REV CODE- 250/636: Performed by: PHYSICIAN ASSISTANT

## 2023-01-28 PROCEDURE — 96375 TX/PRO/DX INJ NEW DRUG ADDON: CPT

## 2023-01-28 PROCEDURE — 96374 THER/PROPH/DIAG INJ IV PUSH: CPT

## 2023-01-28 PROCEDURE — 81001 URINALYSIS AUTO W/SCOPE: CPT

## 2023-01-28 PROCEDURE — 81025 URINE PREGNANCY TEST: CPT

## 2023-01-28 RX ORDER — PROMETHAZINE HYDROCHLORIDE 25 MG/1
25 SUPPOSITORY RECTAL
Qty: 12 SUPPOSITORY | Refills: 0 | Status: SHIPPED | OUTPATIENT
Start: 2023-01-28 | End: 2023-02-04

## 2023-01-28 RX ORDER — DIPHENHYDRAMINE HYDROCHLORIDE 50 MG/ML
25 INJECTION, SOLUTION INTRAMUSCULAR; INTRAVENOUS
Status: COMPLETED | OUTPATIENT
Start: 2023-01-28 | End: 2023-01-28

## 2023-01-28 RX ORDER — PROCHLORPERAZINE EDISYLATE 5 MG/ML
10 INJECTION INTRAMUSCULAR; INTRAVENOUS
Status: DISCONTINUED | OUTPATIENT
Start: 2023-01-28 | End: 2023-01-28

## 2023-01-28 RX ORDER — PROCHLORPERAZINE EDISYLATE 5 MG/ML
10 INJECTION INTRAMUSCULAR; INTRAVENOUS
Status: COMPLETED | OUTPATIENT
Start: 2023-01-28 | End: 2023-01-28

## 2023-01-28 RX ORDER — KETOROLAC TROMETHAMINE 15 MG/ML
15 INJECTION, SOLUTION INTRAMUSCULAR; INTRAVENOUS
Status: COMPLETED | OUTPATIENT
Start: 2023-01-28 | End: 2023-01-28

## 2023-01-28 RX ORDER — KETOROLAC TROMETHAMINE 30 MG/ML
30 INJECTION, SOLUTION INTRAMUSCULAR; INTRAVENOUS
Status: DISCONTINUED | OUTPATIENT
Start: 2023-01-28 | End: 2023-01-28

## 2023-01-28 RX ADMIN — PROCHLORPERAZINE EDISYLATE 10 MG: 5 INJECTION INTRAMUSCULAR; INTRAVENOUS at 20:19

## 2023-01-28 RX ADMIN — SODIUM CHLORIDE 1000 ML: 9 INJECTION, SOLUTION INTRAVENOUS at 20:19

## 2023-01-28 RX ADMIN — DIPHENHYDRAMINE HYDROCHLORIDE 25 MG: 50 INJECTION, SOLUTION INTRAMUSCULAR; INTRAVENOUS at 20:19

## 2023-01-28 RX ADMIN — KETOROLAC TROMETHAMINE 15 MG: 15 INJECTION, SOLUTION INTRAMUSCULAR; INTRAVENOUS at 20:27

## 2023-01-29 NOTE — ED PROVIDER NOTES
[place] [address]    [facility] EMERGENCY DEPT  1/28/2023, 8:11 PM    Clinical Impression:  1. Other migraine without status migrainosus, not intractable        Assessment/Differential Diagnosis:     Ddx migraine headache, tension headache, intracranial pathology, elevated blood pressure, cervical strain all considered    ED Course:   Initial assessment performed. The patients presenting problems have been discussed, and they are in agreement with the care plan formulated and outlined with them. I have encouraged them to ask questions as they arise throughout their visit. Pt here with headache. H/O atypical migraine, taking elavil daily. Started with bilat throbbing headache last night, slow evolving, slept well, and then awoke this am with continued headache. Seems worse with neck mvmt. No fever, recent illness, vision change, difficulty swallowing, extremity weakness/numbness, vomiting, sudden onset of pain. NO trauma. Tried Fioricet and flexeril without relief. Exam with no neuro changes. Neck FROM, no meningismus. Pt appears well. Ambulating normally. Will try compazine, benadryl, toradol  IV as well as 1L NS. Recheck with pt feeling better, pain 2/10, neuro intact. BP improved  Will discharge with phenergan supp to use as needed  Return precautions given        Medical Chart Review:  I have reviewed triage nursing documentation. Review of old medical records with the following pertinent information:       Disposition:  Home  in good condition. Chief Complaint   Patient presents with    Headache     HPI:    The history is provided by patient and . No  used. Domo Ibanez is a 50 y.o. female presenting to the Emergency Department with complaints of headache. Patient comes to the ED with her . Patient does have history of atypical headaches. She does take Elavil daily.   She has Fioricet to use with onset of headache. Patient states she started with achiness in the back of her head last night while watching TV. She was able to sleep through the night and when she woke this morning she had continued headache. She states her headache is slowly escalated throughout the day. She describes a throbbing sensation to both sides of her head. She has some achiness into her neck. Pain seems worse with movement of her neck. She states the pain is about a 7/10 at this time. She denies any recent trauma, illness, fever, chills, sudden onset of pain or neck stiffness. There is been no vision change, difficulty swallowing, extremity weakness or numbness. She has had no vomiting, shortness of breath or chest pain. She did try Fioricet without relief as well as a Flexeril without relief. She comes to the ED to ensure no other pathology and for treatment. She denies pregnancy      I have reviewed all PMHX, FMHX and Social Hx as entered into the medical record in the chart below using the Epic Template. Review of Systems:  Constitutional: neg for fever, chills  ENT:  neg for URI symptoms. Neg for vision changes. Respiratory:  neg for cough, shortness of breath  Neck: neg for neck pain, no stiffness  Cardiovascular:  neg for chest pain  GI:  neg for abdominal pain. Neg for nausea, no vomiting  :  No urinary symptoms. No Flank pain. MSK: neg for back pain. Neg for extremity pain or weakness. No injury. Integumentary: no rash  Neurological: positive for headaches,neg head injury. All other systems reviewed negative with exception of positives in ROS and HPI.     Past Medical History:  Past Medical History:   Diagnosis Date    GERD (gastroesophageal reflux disease)     Hypertension     Psychiatric disorder     anxiety       Past Surgical History:  Past Surgical History:   Procedure Laterality Date    COLONOSCOPY N/A 6/24/2019    COLONOSCOPY performed by Lindsey De Jesus MD at [address] [place] [address] SECTION  2003, 2009    HX CHOLECYSTECTOMY  2019       Family History:  History reviewed. No pertinent family history. Social History:  Social History     Tobacco Use    Smoking status: Never    Smokeless tobacco: Never   Substance Use Topics    Alcohol use: Yes     Alcohol/week: 1.0 standard drink     Types: 1 Glasses of wine per week     Comment: once weekly     Drug use: No       Allergies: Allergies   Allergen Reactions    Isoniazid Anaphylaxis       Vital Signs:  Vitals:    01/28/23 1934   BP: (!) 158/104   Pulse: (!) 104   Resp: 16   Temp: 98.1 °F (36.7 °C)   SpO2: 100%   Weight: 67.1 kg (148 lb)   Height: 5' 7\" (1.702 m)     Physical Exam:  Vital Signs Reviewed. Nursing Notes Reviewed. Constitutional:  Well developed, well nourished patient. Appearance and behavior are age and situation appropriate. Ambulating normally  Head: Normocephalic, Atraumatic , nontender  Eyes: Visual acuity grossly normal by my exam. Conjunctiva clear, Sclera anicteric. PERRLA. EOMs intact. Eyelids normal   ENT:hearing grossly intact, TMs normal. Throat normal. No mouth trauma   Neck:  supple, FROM , slt tenderness bilat base of skull at insertion of cervical muscles  Lungs: No respiratory distress. Lungs CTAB   CV:  RR&R without murmur   Thorax: no chest wall tenderness to palpation. No signs of trauma   Extremities: normal movement of all 4 extremities. No signs of trauma, No pain with palpation. Neuro:  A&O x 3. CN II-XII grossly intact. No gross neuro deficits. No facial asymmetry with testing. Cerebellar tests intact. Strength and sensation intact, bilat equal with testing of UE and LE   Skin:  Warm, dry, no rash. Spine:  No tenderness to palpation over the cervical spine area. No tenderness to palpation over thoracic or lumbar spine. No signs of trauma. No bony defect on my exam. No swelling.      Diagnostics:    Labs -     Recent Results (from the past 12 hour(s))   URINALYSIS W/ RFLX MICROSCOPIC    Collection Time: 01/28/23  7:46 PM   Result Value Ref Range    Color YELLOW      Appearance CLEAR      Specific gravity <1.005 1.003 - 1.030    pH (UA) 6.5 5.0 - 8.0      Protein Negative NEG mg/dL    Glucose Negative NEG mg/dL    Ketone Negative NEG mg/dL    Bilirubin Negative NEG      Blood Negative NEG      Urobilinogen 0.2 0.2 - 1.0 EU/dL    Nitrites Negative NEG      Leukocyte Esterase TRACE (A) NEG     HCG URINE, QL    Collection Time: 01/28/23  7:46 PM   Result Value Ref Range    HCG urine, QL Negative NEG     CBC WITH AUTOMATED DIFF    Collection Time: 01/28/23  7:46 PM   Result Value Ref Range    WBC 8.5 4.6 - 13.2 K/uL    RBC 4.50 4. 20 - 5.30 M/uL    HGB 13.2 12.0 - 16.0 g/dL    HCT 39.6 35.0 - 45.0 %    MCV 88.0 78.0 - 100.0 FL    MCH 29.3 24.0 - 34.0 PG    MCHC 33.3 31.0 - 37.0 g/dL    RDW 12.8 11.6 - 14.5 %    PLATELET 833 458 - 814 K/uL    MPV 9.9 9.2 - 11.8 FL    NRBC 0.0 0  WBC    ABSOLUTE NRBC 0.00 0.00 - 0.01 K/uL    NEUTROPHILS 44 40 - 73 %    LYMPHOCYTES 46 21 - 52 %    MONOCYTES 6 3 - 10 %    EOSINOPHILS 3 0 - 5 %    BASOPHILS 1 0 - 2 %    IMMATURE GRANULOCYTES 0 0.0 - 0.5 %    ABS. NEUTROPHILS 3.8 1.8 - 8.0 K/UL    ABS. LYMPHOCYTES 3.9 (H) 0.9 - 3.6 K/UL    ABS. MONOCYTES 0.5 0.05 - 1.2 K/UL    ABS. EOSINOPHILS 0.3 0.0 - 0.4 K/UL    ABS. BASOPHILS 0.0 0.0 - 0.1 K/UL    ABS. IMM. GRANS. 0.0 0.00 - 0.04 K/UL    DF AUTOMATED     METABOLIC PANEL, COMPREHENSIVE    Collection Time: 01/28/23  7:46 PM   Result Value Ref Range    Sodium 139 136 - 145 mmol/L    Potassium 4.5 3.5 - 5.5 mmol/L    Chloride 105 100 - 111 mmol/L    CO2 26 21 - 32 mmol/L    Anion gap 8 3.0 - 18 mmol/L    Glucose 93 74 - 99 mg/dL    BUN 8 7.0 - 18 MG/DL    Creatinine 0.84 0.6 - 1.3 MG/DL    BUN/Creatinine ratio 10 (L) 12 - 20      eGFR >60 >60 ml/min/1.73m2    Calcium 9.3 8.5 - 10.1 MG/DL    Bilirubin, total 0.3 0.2 - 1.0 MG/DL    ALT (SGPT) 22 13 - 56 U/L    AST (SGOT) 16 10 - 38 U/L    Alk.  phosphatase 71 45 - 117 U/L    Protein, total 8.0 6.4 - 8.2 g/dL    Albumin 4.0 3.4 - 5.0 g/dL    Globulin 4.0 2.0 - 4.0 g/dL    A-G Ratio 1.0 0.8 - 1.7     URINE MICROSCOPIC ONLY    Collection Time: 01/28/23  7:46 PM   Result Value Ref Range    WBC 0 to 3 0 - 5 /hpf    RBC 0 to 3 0 - 5 /hpf    Epithelial cells FEW 0 - 5 /lpf    Bacteria FEW (A) NEG /hpf       Radiologic Studies -   No orders to display     CT Results  (Last 48 hours)      None          CXR Results  (Last 48 hours)      None            Medications given in the ED-  Medications   sodium chloride 0.9 % bolus infusion 1,000 mL (0 mL IntraVENous IV Completed 1/28/23 2106)   diphenhydrAMINE (BENADRYL) injection 25 mg (25 mg IntraVENous Given 1/28/23 2019)   prochlorperazine (COMPAZINE) injection 10 mg (10 mg IntraVENous Given 1/28/23 2019)   ketorolac (TORADOL) injection 15 mg (15 mg IntraVENous Given 1/28/23 2027)       Please note that this dictation was completed with [employer] the [employer] voice recognition software. Quite often unanticipated grammatical, syntax, homophones, and other interpretive errors are inadvertently transcribed by the computer software. Please disregard these errors. Please excuse any errors that have escaped final proofreading.

## 2023-01-29 NOTE — DISCHARGE INSTRUCTIONS
Stay well-hydrated  Healthy diet  Continue your home medications  Follow-up with your neurologist next week  Phenergan suppositories were prescribed.   You can use 1 rectally at the start of a headache and again every 6 hours as needed  Return to ER if new or worsening symptoms or new concerns

## 2023-01-29 NOTE — ED TRIAGE NOTES
Pt CC of headache, hx of complex migraines. Neurologist is Dr. Anatoly Pierre here at this hospital. Denies any other complaints. Time of onset: last night   Description of pain: throbbing   Treatment PTA: denies   Aggravating factors: movement   Alleviating factors: denies   Associated symptoms: N denies V/D/C      Pt ambulated independently  A&Ox4  Speaking in full sentences clearly  Respirations even and unlabored  Skin pink/warm/dry/intact  Patient behavior: Sitting comfortably on cot, no distress noted.

## 2024-02-28 ENCOUNTER — APPOINTMENT (OUTPATIENT)
Facility: HOSPITAL | Age: 54
DRG: 176 | End: 2024-02-28
Payer: COMMERCIAL

## 2024-02-28 ENCOUNTER — HOSPITAL ENCOUNTER (INPATIENT)
Facility: HOSPITAL | Age: 54
LOS: 1 days | Discharge: HOME | DRG: 176 | End: 2024-02-29
Attending: HOSPITALIST | Admitting: HOSPITALIST
Payer: COMMERCIAL

## 2024-02-28 DIAGNOSIS — I26.94 MULTIPLE SUBSEGMENTAL PULMONARY EMBOLI WITHOUT ACUTE COR PULMONALE (HCC): Primary | ICD-10-CM

## 2024-02-28 DIAGNOSIS — I26.99 PE (PULMONARY THROMBOEMBOLISM) (HCC): ICD-10-CM

## 2024-02-28 DIAGNOSIS — R06.02 SOB (SHORTNESS OF BREATH): ICD-10-CM

## 2024-02-28 DIAGNOSIS — S89.91XA RIGHT KNEE INJURY, INITIAL ENCOUNTER: ICD-10-CM

## 2024-02-28 PROBLEM — I10 HYPERTENSION: Status: ACTIVE | Noted: 2024-02-28

## 2024-02-28 PROBLEM — I82.401 ACUTE DEEP VEIN THROMBOSIS (DVT) OF RIGHT LOWER EXTREMITY (HCC): Status: ACTIVE | Noted: 2024-02-28

## 2024-02-28 LAB
ALBUMIN SERPL-MCNC: 3.5 G/DL (ref 3.4–5)
ALBUMIN/GLOB SERPL: 1 (ref 0.8–1.7)
ALP SERPL-CCNC: 80 U/L (ref 45–117)
ALT SERPL-CCNC: 13 U/L (ref 13–56)
ANION GAP SERPL CALC-SCNC: 7 MMOL/L (ref 3–18)
APTT PPP: 113.8 SEC (ref 23–36.4)
APTT PPP: 30.9 SEC (ref 23–36.4)
APTT PPP: 84.8 SEC (ref 23–36.4)
AST SERPL-CCNC: 12 U/L (ref 10–38)
BASOPHILS # BLD: 0 K/UL (ref 0–0.1)
BASOPHILS NFR BLD: 0 % (ref 0–2)
BILIRUB SERPL-MCNC: 0.4 MG/DL (ref 0.2–1)
BUN SERPL-MCNC: 7 MG/DL (ref 7–18)
BUN/CREAT SERPL: 8 (ref 12–20)
CALCIUM SERPL-MCNC: 9.2 MG/DL (ref 8.5–10.1)
CHLORIDE SERPL-SCNC: 110 MMOL/L (ref 100–111)
CO2 SERPL-SCNC: 23 MMOL/L (ref 21–32)
CREAT SERPL-MCNC: 0.85 MG/DL (ref 0.6–1.3)
D DIMER PPP FEU-MCNC: 5.13 UG/ML(FEU)
DIFFERENTIAL METHOD BLD: ABNORMAL
ECHO BSA: 1.8 M2
EKG ATRIAL RATE: 68 BPM
EKG DIAGNOSIS: NORMAL
EKG P AXIS: 57 DEGREES
EKG P-R INTERVAL: 154 MS
EKG Q-T INTERVAL: 436 MS
EKG QRS DURATION: 90 MS
EKG QTC CALCULATION (BAZETT): 463 MS
EKG R AXIS: 47 DEGREES
EKG T AXIS: 58 DEGREES
EKG VENTRICULAR RATE: 68 BPM
EOSINOPHIL # BLD: 0.2 K/UL (ref 0–0.4)
EOSINOPHIL NFR BLD: 3 % (ref 0–5)
ERYTHROCYTE [DISTWIDTH] IN BLOOD BY AUTOMATED COUNT: 12.2 % (ref 11.6–14.5)
GLOBULIN SER CALC-MCNC: 3.4 G/DL (ref 2–4)
GLUCOSE SERPL-MCNC: 89 MG/DL (ref 74–99)
HCG SERPL QL: NEGATIVE
HCT VFR BLD AUTO: 35.2 % (ref 35–45)
HGB BLD-MCNC: 12 G/DL (ref 12–16)
IMM GRANULOCYTES # BLD AUTO: 0 K/UL (ref 0–0.04)
IMM GRANULOCYTES NFR BLD AUTO: 0 % (ref 0–0.5)
INR PPP: 1 (ref 0.9–1.1)
LYMPHOCYTES # BLD: 2.1 K/UL (ref 0.9–3.6)
LYMPHOCYTES NFR BLD: 25 % (ref 21–52)
MCH RBC QN AUTO: 30.3 PG (ref 24–34)
MCHC RBC AUTO-ENTMCNC: 34.1 G/DL (ref 31–37)
MCV RBC AUTO: 88.9 FL (ref 78–100)
MONOCYTES # BLD: 0.4 K/UL (ref 0.05–1.2)
MONOCYTES NFR BLD: 5 % (ref 3–10)
NEUTS SEG # BLD: 5.5 K/UL (ref 1.8–8)
NEUTS SEG NFR BLD: 66 % (ref 40–73)
NRBC # BLD: 0 K/UL (ref 0–0.01)
NRBC BLD-RTO: 0 PER 100 WBC
PLATELET # BLD AUTO: 286 K/UL (ref 135–420)
PMV BLD AUTO: 10.2 FL (ref 9.2–11.8)
POTASSIUM SERPL-SCNC: 3.9 MMOL/L (ref 3.5–5.5)
PROT SERPL-MCNC: 6.9 G/DL (ref 6.4–8.2)
PROTHROMBIN TIME: 13.2 SEC (ref 11.9–14.7)
RBC # BLD AUTO: 3.96 M/UL (ref 4.2–5.3)
SODIUM SERPL-SCNC: 140 MMOL/L (ref 136–145)
TROPONIN I SERPL HS-MCNC: 3 NG/L (ref 0–54)
WBC # BLD AUTO: 8.3 K/UL (ref 4.6–13.2)

## 2024-02-28 PROCEDURE — 6360000002 HC RX W HCPCS: Performed by: PHYSICIAN ASSISTANT

## 2024-02-28 PROCEDURE — 93971 EXTREMITY STUDY: CPT

## 2024-02-28 PROCEDURE — 93005 ELECTROCARDIOGRAM TRACING: CPT | Performed by: PHYSICIAN ASSISTANT

## 2024-02-28 PROCEDURE — 6370000000 HC RX 637 (ALT 250 FOR IP): Performed by: HOSPITALIST

## 2024-02-28 PROCEDURE — 99285 EMERGENCY DEPT VISIT HI MDM: CPT

## 2024-02-28 PROCEDURE — 84484 ASSAY OF TROPONIN QUANT: CPT

## 2024-02-28 PROCEDURE — 1100000003 HC PRIVATE W/ TELEMETRY

## 2024-02-28 PROCEDURE — 6360000004 HC RX CONTRAST MEDICATION: Performed by: PHYSICIAN ASSISTANT

## 2024-02-28 PROCEDURE — 2580000003 HC RX 258: Performed by: PHYSICIAN ASSISTANT

## 2024-02-28 PROCEDURE — 71275 CT ANGIOGRAPHY CHEST: CPT

## 2024-02-28 PROCEDURE — 85730 THROMBOPLASTIN TIME PARTIAL: CPT

## 2024-02-28 PROCEDURE — 80053 COMPREHEN METABOLIC PANEL: CPT

## 2024-02-28 PROCEDURE — 85025 COMPLETE CBC W/AUTO DIFF WBC: CPT

## 2024-02-28 PROCEDURE — 71045 X-RAY EXAM CHEST 1 VIEW: CPT

## 2024-02-28 PROCEDURE — 36415 COLL VENOUS BLD VENIPUNCTURE: CPT

## 2024-02-28 PROCEDURE — 84703 CHORIONIC GONADOTROPIN ASSAY: CPT

## 2024-02-28 PROCEDURE — 85610 PROTHROMBIN TIME: CPT

## 2024-02-28 PROCEDURE — 85379 FIBRIN DEGRADATION QUANT: CPT

## 2024-02-28 RX ORDER — AMITRIPTYLINE HYDROCHLORIDE 25 MG/1
25 TABLET, FILM COATED ORAL NIGHTLY
COMMUNITY

## 2024-02-28 RX ORDER — HYDROCHLOROTHIAZIDE 12.5 MG/1
12.5 CAPSULE, GELATIN COATED ORAL DAILY
Status: ON HOLD | COMMUNITY
End: 2024-02-29 | Stop reason: HOSPADM

## 2024-02-28 RX ORDER — TOPIRAMATE 25 MG/1
25 CAPSULE ORAL 2 TIMES DAILY
COMMUNITY

## 2024-02-28 RX ORDER — HEPARIN SODIUM 1000 [USP'U]/ML
80 INJECTION, SOLUTION INTRAVENOUS; SUBCUTANEOUS PRN
Status: DISCONTINUED | OUTPATIENT
Start: 2024-02-28 | End: 2024-02-29

## 2024-02-28 RX ORDER — POLYETHYLENE GLYCOL 3350 17 G/17G
17 POWDER, FOR SOLUTION ORAL DAILY PRN
Status: DISCONTINUED | OUTPATIENT
Start: 2024-02-28 | End: 2024-02-29 | Stop reason: HOSPADM

## 2024-02-28 RX ORDER — NORETHINDRONE ACETATE AND ETHINYL ESTRADIOL 1MG-20(21)
1 KIT ORAL DAILY
Status: ON HOLD | COMMUNITY
End: 2024-02-29 | Stop reason: HOSPADM

## 2024-02-28 RX ORDER — HEPARIN SODIUM 1000 [USP'U]/ML
40 INJECTION, SOLUTION INTRAVENOUS; SUBCUTANEOUS PRN
Status: DISCONTINUED | OUTPATIENT
Start: 2024-02-28 | End: 2024-02-29

## 2024-02-28 RX ORDER — AMLODIPINE BESYLATE 2.5 MG/1
2.5 TABLET ORAL DAILY
Status: ON HOLD | COMMUNITY
End: 2024-02-29 | Stop reason: HOSPADM

## 2024-02-28 RX ORDER — HEPARIN SODIUM 1000 [USP'U]/ML
80 INJECTION, SOLUTION INTRAVENOUS; SUBCUTANEOUS ONCE
Status: COMPLETED | OUTPATIENT
Start: 2024-02-28 | End: 2024-02-28

## 2024-02-28 RX ORDER — MAGNESIUM SULFATE IN WATER 40 MG/ML
2000 INJECTION, SOLUTION INTRAVENOUS PRN
Status: DISCONTINUED | OUTPATIENT
Start: 2024-02-28 | End: 2024-02-29 | Stop reason: HOSPADM

## 2024-02-28 RX ORDER — 0.9 % SODIUM CHLORIDE 0.9 %
1000 INTRAVENOUS SOLUTION INTRAVENOUS ONCE
Status: COMPLETED | OUTPATIENT
Start: 2024-02-28 | End: 2024-02-28

## 2024-02-28 RX ORDER — ONDANSETRON 2 MG/ML
4 INJECTION INTRAMUSCULAR; INTRAVENOUS EVERY 6 HOURS PRN
Status: DISCONTINUED | OUTPATIENT
Start: 2024-02-28 | End: 2024-02-29 | Stop reason: HOSPADM

## 2024-02-28 RX ORDER — MONTELUKAST SODIUM 4 MG/1
2 TABLET, CHEWABLE ORAL DAILY
COMMUNITY

## 2024-02-28 RX ORDER — SODIUM CHLORIDE 0.9 % (FLUSH) 0.9 %
5-40 SYRINGE (ML) INJECTION EVERY 12 HOURS SCHEDULED
Status: DISCONTINUED | OUTPATIENT
Start: 2024-02-28 | End: 2024-02-29 | Stop reason: HOSPADM

## 2024-02-28 RX ORDER — ASPIRIN 81 MG/1
81 TABLET, CHEWABLE ORAL DAILY
Status: DISCONTINUED | OUTPATIENT
Start: 2024-02-28 | End: 2024-02-29 | Stop reason: HOSPADM

## 2024-02-28 RX ORDER — AMITRIPTYLINE HYDROCHLORIDE 25 MG/1
25 TABLET, FILM COATED ORAL NIGHTLY
Status: DISCONTINUED | OUTPATIENT
Start: 2024-02-28 | End: 2024-02-29 | Stop reason: HOSPADM

## 2024-02-28 RX ORDER — TOPIRAMATE 25 MG/1
25 TABLET ORAL 2 TIMES DAILY
Status: DISCONTINUED | OUTPATIENT
Start: 2024-02-28 | End: 2024-02-29 | Stop reason: HOSPADM

## 2024-02-28 RX ORDER — SODIUM CHLORIDE 9 MG/ML
INJECTION, SOLUTION INTRAVENOUS PRN
Status: DISCONTINUED | OUTPATIENT
Start: 2024-02-28 | End: 2024-02-29 | Stop reason: HOSPADM

## 2024-02-28 RX ORDER — ACETAMINOPHEN 325 MG/1
650 TABLET ORAL EVERY 6 HOURS PRN
Status: DISCONTINUED | OUTPATIENT
Start: 2024-02-28 | End: 2024-02-29 | Stop reason: HOSPADM

## 2024-02-28 RX ORDER — ACETAMINOPHEN 650 MG/1
650 SUPPOSITORY RECTAL EVERY 6 HOURS PRN
Status: DISCONTINUED | OUTPATIENT
Start: 2024-02-28 | End: 2024-02-29 | Stop reason: HOSPADM

## 2024-02-28 RX ORDER — SODIUM CHLORIDE 0.9 % (FLUSH) 0.9 %
5-40 SYRINGE (ML) INJECTION PRN
Status: DISCONTINUED | OUTPATIENT
Start: 2024-02-28 | End: 2024-02-29 | Stop reason: HOSPADM

## 2024-02-28 RX ORDER — POTASSIUM CHLORIDE 20 MEQ/1
40 TABLET, EXTENDED RELEASE ORAL PRN
Status: DISCONTINUED | OUTPATIENT
Start: 2024-02-28 | End: 2024-02-29 | Stop reason: HOSPADM

## 2024-02-28 RX ORDER — VERAPAMIL HYDROCHLORIDE 240 MG/1
240 CAPSULE, EXTENDED RELEASE ORAL NIGHTLY
Status: ON HOLD | COMMUNITY
End: 2024-02-29 | Stop reason: HOSPADM

## 2024-02-28 RX ORDER — ONDANSETRON 4 MG/1
4 TABLET, ORALLY DISINTEGRATING ORAL EVERY 8 HOURS PRN
Status: DISCONTINUED | OUTPATIENT
Start: 2024-02-28 | End: 2024-02-29 | Stop reason: HOSPADM

## 2024-02-28 RX ORDER — POTASSIUM CHLORIDE 7.45 MG/ML
10 INJECTION INTRAVENOUS PRN
Status: DISCONTINUED | OUTPATIENT
Start: 2024-02-28 | End: 2024-02-29 | Stop reason: HOSPADM

## 2024-02-28 RX ORDER — HEPARIN SODIUM 10000 [USP'U]/100ML
5-30 INJECTION, SOLUTION INTRAVENOUS CONTINUOUS
Status: DISCONTINUED | OUTPATIENT
Start: 2024-02-28 | End: 2024-02-29

## 2024-02-28 RX ADMIN — SODIUM CHLORIDE 1000 ML: 9 INJECTION, SOLUTION INTRAVENOUS at 11:32

## 2024-02-28 RX ADMIN — ACETAMINOPHEN 650 MG: 325 TABLET ORAL at 20:01

## 2024-02-28 RX ADMIN — HEPARIN SODIUM 18 UNITS/KG/HR: 10000 INJECTION, SOLUTION INTRAVENOUS at 12:45

## 2024-02-28 RX ADMIN — AMITRIPTYLINE HYDROCHLORIDE 25 MG: 25 TABLET, FILM COATED ORAL at 20:02

## 2024-02-28 RX ADMIN — HEPARIN SODIUM 5480 UNITS: 1000 INJECTION INTRAVENOUS; SUBCUTANEOUS at 12:44

## 2024-02-28 RX ADMIN — IOPAMIDOL 100 ML: 755 INJECTION, SOLUTION INTRAVENOUS at 11:29

## 2024-02-28 RX ADMIN — TOPIRAMATE 25 MG: 25 TABLET, FILM COATED ORAL at 20:02

## 2024-02-28 RX ADMIN — ASPIRIN 81 MG: 81 TABLET, CHEWABLE ORAL at 12:50

## 2024-02-28 ASSESSMENT — PAIN - FUNCTIONAL ASSESSMENT: PAIN_FUNCTIONAL_ASSESSMENT: 0-10

## 2024-02-28 ASSESSMENT — PAIN SCALES - GENERAL
PAINLEVEL_OUTOF10: 6
PAINLEVEL_OUTOF10: 6

## 2024-02-28 ASSESSMENT — PAIN DESCRIPTION - LOCATION: LOCATION: LEG

## 2024-02-28 ASSESSMENT — PAIN DESCRIPTION - DESCRIPTORS: DESCRIPTORS: ACHING

## 2024-02-28 ASSESSMENT — PAIN DESCRIPTION - ORIENTATION: ORIENTATION: RIGHT

## 2024-02-28 NOTE — PROGRESS NOTES
Patient from from the ED via stretcher. Patient ambulated independently. Vital signs stable. Skin assessment performed with LO LEMONS. Patient left with call light and their bed in the lowest position with brakes on.

## 2024-02-28 NOTE — ED NOTES
TRANSFER - OUT REPORT:    Verbal report given to LO Hernandez on Joseline Jimenez  being transferred to [facility] for routine progression of patient care       Report consisted of patient's Situation, Background, Assessment and   Recommendations(SBAR).     Information from the following report(s) ED SBAR was reviewed with the receiving nurse.    [place] Fall Assessment:    Presents to emergency department  because of falls (Syncope, seizure, or loss of consciousness): No  Age > 70: No  Altered Mental Status, Intoxication with alcohol or substance confusion (Disorientation, impaired judgment, poor safety awaremess, or inability to follow instructions): No  Impaired Mobility: Ambulates or transfers with assistive devices or assistance; Unable to ambulate or transer.: No  Nursing Judgement: No          Lines:   Peripheral IV 02/28/24 Left Antecubital (Active)   Site Assessment Clean, dry & intact 02/28/24 0940   Line Status Blood return noted;Flushed 02/28/24 0940        Opportunity for questions and clarification was provided.      Patient transported with:  Registered Nurse

## 2024-02-28 NOTE — ED TRIAGE NOTES
Patient reports she injured her right knee skiing on 02/24/2024. Now she is having shortness of breath. Patient states she drove 5 hours so her  told her to come get checcked out for a blood clot

## 2024-02-28 NOTE — H&P
History & Physical    Patient: Joseline Jimenez MRN: [id number]  CSN: [id number]    YOB: 1970  Age: 54 y.o.  Sex: female      DOA: 2/28/2024  Primary Care Provider:  Hunter Cheng MD      Assessment/Plan     Active Hospital Problems    Diagnosis     PE (pulmonary thromboembolism) (HCC) [I26.99]     Hypertension [I10]     Acute deep vein thrombosis (DVT) of right lower extremity (HCC) [I82.401]        Admit to monitored floor    Pulmonary Embolism/DVT -  Risk factors, long travel, birth control pills.  Started on heparin drip  Plan to discharge on eliquis  Echo to look for right heart strain.  Monitor in telemetry    HTN -  Will hold BP for now  Start depending on blood pressure.     Estimated length of stay : 2 days    CC: right knee pain, SOB       HPI:     Joseline Jimenez is a 54 y.o. female with hypertension, GERD presents to ER with concerns of right knee pain and shortness of breath.  Patient reports that she went to [facility] [place].  She injured her right knee.  She has traveled 6 to 7 hours.  After she came home she has been mostly in the bed as her mobility restricted due to right knee pain.  She was seen by her PCP and right knee x-ray did not show any fracture.  Since yesterday she started having shortness of breath.  She was advised to go to the emergency room.  As such she denies any significant chest pain.  She takes birth control pills.  She denies smoking.  In the emergency room her D-dimer elevated.  CTA chest chest showed multiple bilateral segmental pulmonary emboli involving the right middle lobe and bilateral lower lobes.  Right lower extremity ultrasound showed nonocclusive acute on chronic deep vein thrombosis in the right popliteal vein, occlusive acute on chronic deep vein thrombosis in the right soleal vein, nonocclusive acute on chronic deep venous thrombosis in right peroneal vein.    Past Medical History:   Diagnosis Date    GERD (gastroesophageal

## 2024-02-28 NOTE — ED PROVIDER NOTES
EMERGENCY DEPARTMENT HISTORY & PHYSICAL EXAM    [facility] EMERGENCY DEPT  2/28/24, 8:03 AM EST    Clinical Impression:  1. Multiple subsegmental pulmonary emboli without acute cor pulmonale (HCC)    2. Right knee injury, initial encounter        Assessment/Differential Diagnosis:     Ddx knee strain, meniscal tear, ligament injury, bony injury, shortness of breath, DVT, PE, ACS all considered    ED Course:   Initial assessment performed. The patients presenting problems have been discussed, and they are in agreement with the care plan formulated and outlined with them.  I have encouraged them to ask questions as they arise throughout their visit.    Patient comes to the ED with concern for PE.  Patient states she was skiing on 24 February.  She states she twisted her right knee with significant swelling and pain along the lateral aspect of her knee.  She initially was able to [place] the [place] but is having difficulty ambulating due to the pain.  She denies any head injury.  No neck or back pain.  Patient was seen by her primary care yesterday, x-ray was done and she was told there was no bony abnormality.  She is waiting to see Ortho.  She was prescribed Celebrex.  She feels her knee pain and swelling has improved slightly.  No previous knee or leg injury.  Patient states last night she started feeling short of breath and she describes it as \"unable to get in enough air\".  She states this is happening with movement, walking as well as when she was laying in bed last night.  When asked if she had chest pain she stands \"maybe\" with some discomfort mid sternum, \"as if I need to cough\".  She denies any diaphoresis, arm neck or jaw pain, nausea or pain with exertion.  No history of DVT or PE.  There was a 5-hour car ride home.  She is on oral contraception.  She does not smoke.  Patient does take medications for blood pressure    Healthy-appearing middle-aged female laying on

## 2024-02-28 NOTE — FLOWSHEET NOTE
02/28/24 1600   Vital Signs   Temp 98 °F (36.7 °C)   Temp Source Oral   Pulse 77   Heart Rate Source Monitor   Respirations 16   /64   MAP (Calculated) 84   BP Location Right upper arm   BP Method Automatic   Patient Position Sitting   Oxygen Therapy   SpO2 100 %     This RN rounded on patient. 5 P's addressed. No further needs or complaints voiced at this time. Patient left with call light and their bed in the lowest position with brakes on.

## 2024-02-29 ENCOUNTER — APPOINTMENT (OUTPATIENT)
Facility: HOSPITAL | Age: 54
DRG: 176 | End: 2024-02-29
Attending: HOSPITALIST
Payer: COMMERCIAL

## 2024-02-29 VITALS
OXYGEN SATURATION: 98 % | RESPIRATION RATE: 16 BRPM | HEART RATE: 80 BPM | DIASTOLIC BLOOD PRESSURE: 59 MMHG | SYSTOLIC BLOOD PRESSURE: 115 MMHG | WEIGHT: 162 LBS | HEIGHT: 67 IN | TEMPERATURE: 98.5 F | BODY MASS INDEX: 25.43 KG/M2

## 2024-02-29 PROBLEM — Q21.12 PFO (PATENT FORAMEN OVALE): Status: ACTIVE | Noted: 2024-02-29

## 2024-02-29 LAB
ANION GAP SERPL CALC-SCNC: 9 MMOL/L (ref 3–18)
APTT PPP: 77.1 SEC (ref 23–36.4)
BUN SERPL-MCNC: 8 MG/DL (ref 7–18)
BUN/CREAT SERPL: 10 (ref 12–20)
CALCIUM SERPL-MCNC: 9.3 MG/DL (ref 8.5–10.1)
CHLORIDE SERPL-SCNC: 110 MMOL/L (ref 100–111)
CO2 SERPL-SCNC: 22 MMOL/L (ref 21–32)
CREAT SERPL-MCNC: 0.82 MG/DL (ref 0.6–1.3)
ECHO AO ROOT DIAM: 2.9 CM
ECHO AO ROOT INDEX: 1.57 CM/M2
ECHO AV AREA PEAK VELOCITY: 1.6 CM2
ECHO AV AREA VTI: 1.9 CM2
ECHO AV AREA/BSA PEAK VELOCITY: 0.9 CM2/M2
ECHO AV AREA/BSA VTI: 1 CM2/M2
ECHO AV MEAN GRADIENT: 5 MMHG
ECHO AV MEAN VELOCITY: 1 M/S
ECHO AV PEAK GRADIENT: 10 MMHG
ECHO AV PEAK VELOCITY: 1.6 M/S
ECHO AV VELOCITY RATIO: 0.5
ECHO AV VTI: 24.3 CM
ECHO BSA: 1.86 M2
ECHO LA DIAMETER INDEX: 1.41 CM/M2
ECHO LA DIAMETER: 2.6 CM
ECHO LA TO AORTIC ROOT RATIO: 0.9
ECHO LA VOL A-L A4C: 29 ML (ref 22–52)
ECHO LA VOL MOD A4C: 27 ML (ref 22–52)
ECHO LA VOLUME INDEX A-L A4C: 16 ML/M2 (ref 16–34)
ECHO LA VOLUME INDEX MOD A4C: 15 ML/M2 (ref 16–34)
ECHO LV E' LATERAL VELOCITY: 9 CM/S
ECHO LV E' SEPTAL VELOCITY: 6 CM/S
ECHO LV EDV A2C: 131 ML
ECHO LV EDV A4C: 62 ML
ECHO LV EDV BP: 95 ML (ref 56–104)
ECHO LV EDV INDEX A4C: 34 ML/M2
ECHO LV EDV INDEX BP: 51 ML/M2
ECHO LV EDV NDEX A2C: 71 ML/M2
ECHO LV EJECTION FRACTION A2C: 64 %
ECHO LV EJECTION FRACTION A4C: 57 %
ECHO LV EJECTION FRACTION BIPLANE: 61 % (ref 55–100)
ECHO LV ESV A2C: 47 ML
ECHO LV ESV A4C: 26 ML
ECHO LV ESV BP: 37 ML (ref 19–49)
ECHO LV ESV INDEX A2C: 25 ML/M2
ECHO LV ESV INDEX A4C: 14 ML/M2
ECHO LV ESV INDEX BP: 20 ML/M2
ECHO LV FRACTIONAL SHORTENING: 38 % (ref 28–44)
ECHO LV INTERNAL DIMENSION DIASTOLE INDEX: 2.54 CM/M2
ECHO LV INTERNAL DIMENSION DIASTOLIC: 4.7 CM (ref 3.9–5.3)
ECHO LV INTERNAL DIMENSION SYSTOLIC INDEX: 1.57 CM/M2
ECHO LV INTERNAL DIMENSION SYSTOLIC: 2.9 CM
ECHO LV IVSD: 0.9 CM (ref 0.6–0.9)
ECHO LV MASS 2D: 132.3 G (ref 67–162)
ECHO LV MASS INDEX 2D: 71.5 G/M2 (ref 43–95)
ECHO LV POSTERIOR WALL DIASTOLIC: 0.8 CM (ref 0.6–0.9)
ECHO LV RELATIVE WALL THICKNESS RATIO: 0.34
ECHO LVOT AREA: 3.1 CM2
ECHO LVOT AV VTI INDEX: 0.63
ECHO LVOT DIAM: 2 CM
ECHO LVOT MEAN GRADIENT: 2 MMHG
ECHO LVOT PEAK GRADIENT: 3 MMHG
ECHO LVOT PEAK VELOCITY: 0.8 M/S
ECHO LVOT STROKE VOLUME INDEX: 26.1 ML/M2
ECHO LVOT SV: 48.4 ML
ECHO LVOT VTI: 15.4 CM
ECHO MV A VELOCITY: 0.68 M/S
ECHO MV E DECELERATION TIME (DT): 129.2 MS
ECHO MV E VELOCITY: 0.59 M/S
ECHO MV E/A RATIO: 0.87
ECHO MV E/E' LATERAL: 6.56
ECHO MV E/E' RATIO (AVERAGED): 8.19
ECHO PULMONARY ARTERY END DIASTOLIC PRESSURE: 4 MMHG
ECHO PV REGURGITANT MAX VELOCITY: 1 M/S
ECHO RA END SYSTOLIC VOLUME APICAL 4 CHAMBER INDEX BSA: 10 ML/M2
ECHO RA VOLUME: 19 ML
ECHO RV FREE WALL PEAK S': 13 CM/S
ECHO RV INTERNAL DIMENSION: 2.7 CM
ECHO RV TAPSE: 2.7 CM (ref 1.7–?)
ERYTHROCYTE [DISTWIDTH] IN BLOOD BY AUTOMATED COUNT: 12.1 % (ref 11.6–14.5)
GLUCOSE SERPL-MCNC: 82 MG/DL (ref 74–99)
HCT VFR BLD AUTO: 37.3 % (ref 35–45)
HGB BLD-MCNC: 12.4 G/DL (ref 12–16)
MCH RBC QN AUTO: 30.2 PG (ref 24–34)
MCHC RBC AUTO-ENTMCNC: 33.2 G/DL (ref 31–37)
MCV RBC AUTO: 91 FL (ref 78–100)
NRBC # BLD: 0 K/UL (ref 0–0.01)
NRBC BLD-RTO: 0 PER 100 WBC
PLATELET # BLD AUTO: 297 K/UL (ref 135–420)
PMV BLD AUTO: 9.9 FL (ref 9.2–11.8)
POTASSIUM SERPL-SCNC: 3.7 MMOL/L (ref 3.5–5.5)
RBC # BLD AUTO: 4.1 M/UL (ref 4.2–5.3)
SODIUM SERPL-SCNC: 141 MMOL/L (ref 136–145)
WBC # BLD AUTO: 7.5 K/UL (ref 4.6–13.2)

## 2024-02-29 PROCEDURE — 80048 BASIC METABOLIC PNL TOTAL CA: CPT

## 2024-02-29 PROCEDURE — 93306 TTE W/DOPPLER COMPLETE: CPT

## 2024-02-29 PROCEDURE — 6370000000 HC RX 637 (ALT 250 FOR IP): Performed by: HOSPITALIST

## 2024-02-29 PROCEDURE — 85027 COMPLETE CBC AUTOMATED: CPT

## 2024-02-29 PROCEDURE — 36415 COLL VENOUS BLD VENIPUNCTURE: CPT

## 2024-02-29 PROCEDURE — 6360000002 HC RX W HCPCS: Performed by: PHYSICIAN ASSISTANT

## 2024-02-29 PROCEDURE — 2580000003 HC RX 258: Performed by: HOSPITALIST

## 2024-02-29 PROCEDURE — 85730 THROMBOPLASTIN TIME PARTIAL: CPT

## 2024-02-29 RX ADMIN — ACETAMINOPHEN 650 MG: 325 TABLET ORAL at 11:12

## 2024-02-29 RX ADMIN — TOPIRAMATE 25 MG: 25 TABLET, FILM COATED ORAL at 11:12

## 2024-02-29 RX ADMIN — RIVAROXABAN 15 MG: 15 TABLET, FILM COATED ORAL at 17:36

## 2024-02-29 RX ADMIN — SODIUM CHLORIDE, PRESERVATIVE FREE 10 ML: 5 INJECTION INTRAVENOUS at 11:00

## 2024-02-29 RX ADMIN — ASPIRIN 81 MG: 81 TABLET, CHEWABLE ORAL at 11:12

## 2024-02-29 RX ADMIN — HEPARIN SODIUM 16 UNITS/KG/HR: 10000 INJECTION, SOLUTION INTRAVENOUS at 11:24

## 2024-02-29 ASSESSMENT — PAIN SCALES - GENERAL
PAINLEVEL_OUTOF10: 7
PAINLEVEL_OUTOF10: 7

## 2024-02-29 ASSESSMENT — PAIN DESCRIPTION - DESCRIPTORS
DESCRIPTORS: ACHING
DESCRIPTORS: ACHING

## 2024-02-29 ASSESSMENT — PAIN DESCRIPTION - ORIENTATION
ORIENTATION: RIGHT
ORIENTATION: RIGHT

## 2024-02-29 ASSESSMENT — PAIN DESCRIPTION - LOCATION
LOCATION: LEG
LOCATION: LEG

## 2024-02-29 NOTE — DISCHARGE SUMMARY
input(s): \"TP\", \"ALB\" in the last 72 hours.    Invalid input(s): \"TBIL\", \"AP\", \"SGOT\", \"GPT\", \"DBIL\"   Thyroid Studies No results found for: \"T4\", \"T3RU\", \"TSH\"         Significant Diagnostic Studies: Echo (TTE) complete (PRN contrast/bubble/strain/3D)    Result Date: 2/29/2024    Left Ventricle: Normal left ventricular systolic function with a visually estimated EF of 60 - 65%. Left ventricle size is normal. Normal wall thickness. Normal wall motion. Grade I diastolic dysfunction present with normal LV EF.   Tricuspid Valve : Unable to assess RVSP due to inadequate or insignificant tricuspid regurgitation.   Interatrial Septum: Grade III Positive (>20 bubbles). Agitated saline study was positive with provocation. Right to left shunt was noted.   Pericardium: Trivial localized pericardial effusion present around the right ventricle and right atrium. No indication of cardiac tamponade.     Vascular duplex lower extremity venous right    Result Date: 2/28/2024    Non-occlusive acute on chronic deep vein thrombosis in the right popliteal vein.   Occlusive acute on chronic deep vein thrombosis in the right soleal vein.   Non-occlusive acute on chronic deep vein thrombosis in the right peroneal vein.   For comparison purposes, the left common femoral vein was briefly interrogated. The vein demonstrates normal color filling and compressibility. Doppler flow was phasic and spontaneous.     CTA CHEST W WO CONTRAST    Result Date: 2/28/2024  EXAM:  CTA CHEST W WO CONTRAST INDICATION: Shortness of breath and elevated d-dimer with history of knee injury COMPARISON: None. TECHNIQUE: Helical thin section chest CT following intravenous administration of nonionic contrast 100 mL of isovue 370 according to departmental PE protocol. Coronal and sagittal reformats were performed. 3D post processing was performed.  CT dose reduction was achieved through the use of a standardized protocol tailored for this examination and automatic

## 2024-02-29 NOTE — PROGRESS NOTES
Pulse oximetry assessment 100% at rest on room air (if 88% or less, skip next steps)  100% while ambulating on room air  100% at rest on 0 LPM  100% while ambulating on 0 LPM

## 2024-02-29 NOTE — CARE COORDINATION
Case Management Assessment  Initial Evaluation    Date/Time of Evaluation: 2/29/2024 9:37 AM  Assessment Completed by: Tai Robles RN    If patient is discharged prior to next notation, then this note serves as note for discharge by case management.    Patient Name: Joseline Jimenez                   YOB: 1970  Diagnosis: PE (pulmonary thromboembolism) (HCC) [I26.99]  Right knee injury, initial encounter [S89.91XA]  Multiple subsegmental pulmonary emboli without acute cor pulmonale (HCC) [I26.94]                   Date / Time: 2/28/2024  7:50 AM    Patient Admission Status: Inpatient   Readmission Risk (Low < 19, Mod (19-27), High > 27): Readmission Risk Score: 3.9    Current PCP: Hunter Cheng MD  PCP verified by CM? Yes    Chart Reviewed: Yes      History Provided by: Patient  Patient Orientation: Alert and Oriented    Patient Cognition: Alert    Hospitalization in the last 30 days (Readmission):  No    If yes, Readmission Assessment in CM Navigator will be completed.    Advance Directives:      Code Status: Full Code   Patient's Primary Decision Maker is:        Discharge Planning:    Patient lives with: Spouse/Significant Other Type of Home: House  Primary Care Giver: Self  Patient Support Systems include: Spouse/Significant Other, Children   Current Financial resources: None  Current community resources: None  Current services prior to admission: None            Current DME:              Type of Home Care services:  None    ADLS  Prior functional level: Independent in ADLs/IADLs  Current functional level: Independent in ADLs/IADLs    PT AM-PAC:   /24  OT AM-PAC:   /24    Family can provide assistance at DC: Yes  Would you like Case Management to discuss the discharge plan with any other family members/significant others, and if so, who?    Plans to Return to Present Housing: Yes  Other Identified Issues/Barriers to RETURNING to current housing: None  Potential Assistance needed at discharge:

## 2024-03-01 NOTE — PROGRESS NOTES
Patient discharged in care of family in stable condition after all discharge instructions reviewed. Opportunity for questions provided. Patient made aware of all follow up visits. No discharge needs anticipated. Escorted to car.

## 2024-05-23 ENCOUNTER — HOSPITAL ENCOUNTER (OUTPATIENT)
Facility: HOSPITAL | Age: 54
Discharge: HOME | End: 2024-05-26

## 2024-05-23 LAB — SENTARA SPECIMEN COLLECTION: NORMAL

## 2024-05-23 PROCEDURE — 99001 SPECIMEN HANDLING PT-LAB: CPT

## 2024-06-04 ENCOUNTER — TRANSCRIBE ORDERS (OUTPATIENT)
Facility: HOSPITAL | Age: 54
End: 2024-06-04

## 2024-06-04 DIAGNOSIS — I82.411 DEEP VEIN THROMBOSIS (DVT) OF FEMORAL VEIN OF RIGHT LOWER EXTREMITY, UNSPECIFIED CHRONICITY (HCC): Primary | ICD-10-CM

## 2024-06-13 ENCOUNTER — HOSPITAL ENCOUNTER (OUTPATIENT)
Facility: HOSPITAL | Age: 54
Discharge: HOME | End: 2024-06-16

## 2024-06-13 LAB — SENTARA SPECIMEN COLLECTION: NORMAL

## 2024-06-13 PROCEDURE — 99001 SPECIMEN HANDLING PT-LAB: CPT

## 2024-06-14 ENCOUNTER — HOSPITAL ENCOUNTER (OUTPATIENT)
Facility: HOSPITAL | Age: 54
Discharge: HOME | End: 2024-06-14
Attending: INTERNAL MEDICINE
Payer: COMMERCIAL

## 2024-06-14 DIAGNOSIS — I82.411 DEEP VEIN THROMBOSIS (DVT) OF FEMORAL VEIN OF RIGHT LOWER EXTREMITY, UNSPECIFIED CHRONICITY (HCC): ICD-10-CM

## 2024-06-14 LAB
BASOPHILS ABSOLUTE: 0.1 K/UL (ref 0–0.2)
BASOPHILS RELATIVE PERCENT: 1 % (ref 0–2)
EOSINOPHIL # BLD: 3 % (ref 0–6)
EOSINOPHILS ABSOLUTE: 0.2 K/UL (ref 0–0.5)
FOLLICLE STIMULATING HORMONE: 17.1 MIU/ML (ref 1.5–12.4)
HCT VFR BLD CALC: 33 % (ref 39.3–51.6)
HEMOGLOBIN: 10.4 G/DL (ref 13.1–17.2)
IRON % SATURATION: 15 % (ref 20–50)
IRON: 56 MCG/DL (ref 45–160)
LUTEINIZING HORMONE: 27.4 MIU/ML (ref 1.7–8.6)
LYMPHOCYTES # BLD: 38 % (ref 20–45)
LYMPHOCYTES ABSOLUTE: 3.3 K/UL (ref 1–4.8)
MCH RBC QN AUTO: 29 PG (ref 26–34)
MCHC RBC AUTO-ENTMCNC: 32 G/DL (ref 31–36)
MCV RBC AUTO: 92 FL (ref 80–95)
MONOCYTES ABSOLUTE: 0.6 K/UL (ref 0.1–1)
MONOCYTES: 7 % (ref 3–12)
NEUTROPHILS ABSOLUTE: 4.4 K/UL (ref 1.8–7.7)
NEUTROPHILS: 52 % (ref 40–75)
PDW BLD-RTO: 12.2 % (ref 10–15.5)
PLATELET # BLD: 458 K/UL (ref 140–440)
PMV BLD AUTO: 10.2 FL (ref 9–13)
RBC # BLD: 3.6 M/UL (ref 3.8–5.8)
TOTAL IRON BINDING CAPACITY: 376 MCG/DL (ref 228–428)
UIBC: 320 MCG/DL (ref 110–370)
WBC # BLD: 8.6 K/UL (ref 4–11)

## 2024-06-14 PROCEDURE — 93971 EXTREMITY STUDY: CPT

## 2024-08-20 ENCOUNTER — HOSPITAL ENCOUNTER (OUTPATIENT)
Facility: HOSPITAL | Age: 54
Discharge: HOME | End: 2024-08-23
Attending: INTERNAL MEDICINE
Payer: COMMERCIAL

## 2024-08-20 DIAGNOSIS — I26.99 IATROGENIC PULMONARY EMBOLISM AND INFARCTION, INITIAL ENCOUNTER (HCC): ICD-10-CM

## 2024-08-20 DIAGNOSIS — T81.718A IATROGENIC PULMONARY EMBOLISM AND INFARCTION, INITIAL ENCOUNTER (HCC): ICD-10-CM

## 2024-08-20 PROCEDURE — 71275 CT ANGIOGRAPHY CHEST: CPT

## 2024-08-20 PROCEDURE — 6360000004 HC RX CONTRAST MEDICATION: Performed by: INTERNAL MEDICINE

## 2024-08-20 RX ADMIN — IOPAMIDOL 100 ML: 755 INJECTION, SOLUTION INTRAVENOUS at 13:11

## 2024-09-12 ENCOUNTER — HOSPITAL ENCOUNTER (OUTPATIENT)
Facility: HOSPITAL | Age: 54
Discharge: HOME | End: 2024-09-15

## 2024-09-12 LAB — SENTARA SPECIMEN COLLECTION: NORMAL

## 2024-09-12 PROCEDURE — 99001 SPECIMEN HANDLING PT-LAB: CPT

## 2025-02-07 ENCOUNTER — HOSPITAL ENCOUNTER (OUTPATIENT)
Facility: HOSPITAL | Age: 55
Discharge: HOME | End: 2025-02-10
Attending: INTERNAL MEDICINE
Payer: COMMERCIAL

## 2025-02-07 DIAGNOSIS — I82.411 THROMBOSIS OF RIGHT FEMORAL VEIN (HCC): ICD-10-CM

## 2025-02-07 LAB — CREAT UR-MCNC: 1 MG/DL (ref 0.6–1.3)

## 2025-02-07 PROCEDURE — 6360000004 HC RX CONTRAST MEDICATION: Performed by: INTERNAL MEDICINE

## 2025-02-07 PROCEDURE — 71275 CT ANGIOGRAPHY CHEST: CPT

## 2025-02-07 PROCEDURE — 82565 ASSAY OF CREATININE: CPT

## 2025-02-07 RX ORDER — IOPAMIDOL 755 MG/ML
80 INJECTION, SOLUTION INTRAVASCULAR
Status: COMPLETED | OUTPATIENT
Start: 2025-02-07 | End: 2025-02-07

## 2025-02-07 RX ADMIN — IOPAMIDOL 67 ML: 755 INJECTION, SOLUTION INTRAVENOUS at 16:03

## (undated) DEVICE — NDL FLTR TIP 5 MIC 18GX1.5IN --

## (undated) DEVICE — SINGLE PORT MANIFOLD: Brand: NEPTUNE 2

## (undated) DEVICE — NDL PRT INJ NSAF BLNT 18GX1.5 --

## (undated) DEVICE — SOLUTION LACTATED RINGERS INJECTION USP

## (undated) DEVICE — MAJ-1414 SINGLE USE ADPATER BIOPSY VALV: Brand: SINGLE USE ADAPTOR BIOPSY VALVE

## (undated) DEVICE — MEDI-VAC NON-CONDUCTIVE SUCTION TUBING: Brand: CARDINAL HEALTH

## (undated) DEVICE — 4-PORT MANIFOLD: Brand: NEPTUNE 2

## (undated) DEVICE — GAUZE SPNG AVANT 4X4 4PLY NS --

## (undated) DEVICE — ELECTRODE ES 36CM LAP WIRE J HK COAT DISPOSABLE CLEANCOAT

## (undated) DEVICE — SUT VCRL + 0 36IN UR6 VIO --

## (undated) DEVICE — SOLUTION IV 500ML 0.9% SOD CHL FLX CONT

## (undated) DEVICE — COVADERM PLUS: Brand: DEROYAL

## (undated) DEVICE — DEVON™ KNEE AND BODY STRAP 60" X 3" (1.5 M X 7.6 CM): Brand: DEVON

## (undated) DEVICE — SPONGE GZ W4XL4IN COT 12 PLY TYP VII WVN C FLD DSGN

## (undated) DEVICE — CATH IV SAFE STR 22GX1IN BLU -- PROTECTIV PLUS

## (undated) DEVICE — Device

## (undated) DEVICE — STERILE COTTON TIPPED APPLICATORS: Brand: PURITAN

## (undated) DEVICE — NEEDLE INSUF L120MM DIA2MM DISP FOR PNEUMOPERI ENDOPATH

## (undated) DEVICE — CATH SUC CTRL PRT TRIFLO 14FR --

## (undated) DEVICE — ENDOCUT SCISSOR TIP, DISPOSABLE: Brand: RENEW

## (undated) DEVICE — SYR 5ML 1/5 GRAD LL NSAF LF --

## (undated) DEVICE — SOL IRRIGATION INJ NACL 0.9% 500ML BTL

## (undated) DEVICE — APPLIER CLP M L L11.4IN DIA10MM ENDOSCP ROT MULT FOR LIG

## (undated) DEVICE — TROCAR ENDOSCP L100MM DIA11MM DIL TIP STBL SL DISP ENDOPATH

## (undated) DEVICE — SLEEVE TRCR L100MM DIA5MM UNIV STBL FOR BLDELSS DIL TIP

## (undated) DEVICE — REM POLYHESIVE ADULT PATIENT RETURN ELECTRODE: Brand: VALLEYLAB

## (undated) DEVICE — CANNULA CUSH AD W/ 14FT TBG

## (undated) DEVICE — TROCAR ENDOSCP SHFT L100MM DIA5MM DIL TIP ENDOPATH XCEL

## (undated) DEVICE — [HIGH FLOW INSUFFLATOR,  DO NOT USE IF PACKAGE IS DAMAGED,  KEEP DRY,  KEEP AWAY FROM SUNLIGHT,  PROTECT FROM HEAT AND RADIOACTIVE SOURCES.]: Brand: PNEUMOSURE

## (undated) DEVICE — STERILE POLYISOPRENE POWDER-FREE SURGICAL GLOVES WITH EMOLLIENT COATING: Brand: PROTEXIS

## (undated) DEVICE — KENDALL SCD EXPRESS SLEEVES, KNEE LENGTH, MEDIUM: Brand: KENDALL SCD

## (undated) DEVICE — TRNQT TEXT 1X18IN BLU LF DISP -- CONVERT TO ITEM 362165

## (undated) DEVICE — SUT MONOCRYL PLUS UD 4-0 --

## (undated) DEVICE — MOUTHPIECE ENDOSCP 20X27MM --

## (undated) DEVICE — SYRINGE 50ML E/T

## (undated) DEVICE — SET ADMIN 16ML TBNG L100IN 2 Y INJ SITE IV PIGGY BK DISP

## (undated) DEVICE — ENDO CARRY-ON PROCEDURE KIT INCLUDES ENZYMATIC SPONGE, GAUZE, BIOHAZARD LABEL, TRAY, LUBRICANT, DIRTY SCOPE LABEL, WATER LABEL, TRAY, DRAWSTRING PAD, AND DEFENDO 4-PIECE KIT.: Brand: ENDO CARRY-ON PROCEDURE KIT

## (undated) DEVICE — TRAP SPEC COLL POLYP POLYSTYR --

## (undated) DEVICE — STRIP,CLOSURE,WOUND,MEDI-STRIP,1/2X4: Brand: MEDLINE

## (undated) DEVICE — DISPOSABLE SUCTION/IRRIGATOR TUBE SET WITH TIP: Brand: AHTO

## (undated) DEVICE — SYR 3ML LL TIP 1/10ML GRAD --

## (undated) DEVICE — WRISTBAND ID AD W2.5XL9.5CM RED VYN ADH CLSR UNI-PRINT

## (undated) DEVICE — KENDALL RADIOLUCENT FOAM MONITORING ELECTRODE RECTANGULAR SHAPE: Brand: KENDALL

## (undated) DEVICE — MASTISOL ADHESIVE LIQ 2/3ML